# Patient Record
Sex: MALE | Race: WHITE | NOT HISPANIC OR LATINO | Employment: OTHER | URBAN - METROPOLITAN AREA
[De-identification: names, ages, dates, MRNs, and addresses within clinical notes are randomized per-mention and may not be internally consistent; named-entity substitution may affect disease eponyms.]

---

## 2020-06-08 ENCOUNTER — APPOINTMENT (OUTPATIENT)
Dept: CARDIOLOGY | Facility: HOSPITAL | Age: 55
End: 2020-06-08

## 2020-06-08 ENCOUNTER — HOSPITAL ENCOUNTER (INPATIENT)
Facility: HOSPITAL | Age: 55
LOS: 2 days | Discharge: HOME | End: 2020-06-10
Attending: EMERGENCY MEDICINE | Admitting: INTERNAL MEDICINE

## 2020-06-08 ENCOUNTER — APPOINTMENT (OUTPATIENT)
Dept: CT IMAGING | Facility: HOSPITAL | Age: 55
End: 2020-06-08

## 2020-06-08 DIAGNOSIS — R07.9 CHEST PAIN, UNSPECIFIED TYPE: ICD-10-CM

## 2020-06-08 DIAGNOSIS — R41.841 COGNITIVE COMMUNICATION DEFICIT: ICD-10-CM

## 2020-06-08 DIAGNOSIS — E87.6 HYPOKALEMIA: ICD-10-CM

## 2020-06-08 DIAGNOSIS — I63.9 ACUTE ISCHEMIC STROKE (HCC): ICD-10-CM

## 2020-06-08 DIAGNOSIS — R47.1 DYSARTHRIA: ICD-10-CM

## 2020-06-08 DIAGNOSIS — R53.1 LEFT-SIDED WEAKNESS: Primary | ICD-10-CM

## 2020-06-08 PROBLEM — J44.9 COPD (CHRONIC OBSTRUCTIVE PULMONARY DISEASE) (HCC): Status: ACTIVE | Noted: 2020-06-08

## 2020-06-08 PROBLEM — F15.10 CAFFEINE ABUSE (HCC): Status: ACTIVE | Noted: 2020-06-08

## 2020-06-08 PROBLEM — G89.29 CHRONIC PAIN: Status: ACTIVE | Noted: 2020-06-08

## 2020-06-08 PROBLEM — R91.1 PULMONARY NODULE: Status: ACTIVE | Noted: 2020-06-08

## 2020-06-08 PROBLEM — J41.1 BRONCHITIS, MUCOPURULENT RECURRENT (HCC): Status: ACTIVE | Noted: 2020-06-08

## 2020-06-08 PROBLEM — Z87.891 FORMER SMOKER: Status: ACTIVE | Noted: 2020-06-08

## 2020-06-08 PROBLEM — Z78.9 NON-SMOKER: Status: ACTIVE | Noted: 2020-06-08

## 2020-06-08 PROBLEM — B44.9 ASPERGILLOSIS, WITH PNEUMONIA (HCC): Status: ACTIVE | Noted: 2020-06-08

## 2020-06-08 PROBLEM — E66.3 OVERWEIGHT (BMI 25.0-29.9): Status: ACTIVE | Noted: 2020-06-08

## 2020-06-08 PROBLEM — F41.9 ANXIETY DISORDER: Status: ACTIVE | Noted: 2020-06-08

## 2020-06-08 PROBLEM — J45.909 IDIOPATHIC ASTHMA: Status: ACTIVE | Noted: 2020-06-08

## 2020-06-08 PROBLEM — I10 HYPERTENSION: Status: ACTIVE | Noted: 2020-06-08

## 2020-06-08 PROBLEM — R07.89 LEFT-SIDED CHEST WALL PAIN: Status: ACTIVE | Noted: 2020-06-08

## 2020-06-08 LAB
ALT SERPL W P-5'-P-CCNC: 5 U/L (ref 1–41)
AMPHET+METHAMPHET UR QL: NEGATIVE
AMPHETAMINES UR QL: NEGATIVE
APAP SERPL-MCNC: <5 MCG/ML (ref 10–30)
APTT PPP: 36.1 SECONDS (ref 24–37)
ASCENDING AORTA: 4 CM
AST SERPL-CCNC: 17 U/L (ref 1–40)
BARBITURATES UR QL SCN: NEGATIVE
BASE EXCESS BLDA CALC-SCNC: 5 MMOL/L (ref -5–5)
BASOPHILS # BLD AUTO: 0.01 10*3/MM3 (ref 0–0.2)
BASOPHILS NFR BLD AUTO: 0.3 % (ref 0–1.5)
BENZODIAZ UR QL SCN: POSITIVE
BH CV ECHO MEAS - AO MAX PG (FULL): 0.71 MMHG
BH CV ECHO MEAS - AO MAX PG: 8 MMHG
BH CV ECHO MEAS - AO MEAN PG (FULL): 1 MMHG
BH CV ECHO MEAS - AO MEAN PG: 5 MMHG
BH CV ECHO MEAS - AO ROOT AREA (BSA CORRECTED): 1.5
BH CV ECHO MEAS - AO ROOT AREA: 8 CM^2
BH CV ECHO MEAS - AO ROOT DIAM: 3.2 CM
BH CV ECHO MEAS - AO V2 MAX: 145 CM/SEC
BH CV ECHO MEAS - AO V2 MEAN: 102 CM/SEC
BH CV ECHO MEAS - AO V2 VTI: 32.3 CM
BH CV ECHO MEAS - ASC AORTA: 4 CM
BH CV ECHO MEAS - AVA(I,A): 3 CM^2
BH CV ECHO MEAS - AVA(I,D): 3 CM^2
BH CV ECHO MEAS - AVA(V,A): 2.9 CM^2
BH CV ECHO MEAS - AVA(V,D): 2.9 CM^2
BH CV ECHO MEAS - BSA(HAYCOCK): 2.1 M^2
BH CV ECHO MEAS - BSA: 2.1 M^2
BH CV ECHO MEAS - BZI_BMI: 28.3 KILOGRAMS/M^2
BH CV ECHO MEAS - BZI_METRIC_HEIGHT: 177.8 CM
BH CV ECHO MEAS - BZI_METRIC_WEIGHT: 89.4 KG
BH CV ECHO MEAS - EDV(CUBED): 132.7 ML
BH CV ECHO MEAS - EDV(MOD-SP2): 58.6 ML
BH CV ECHO MEAS - EDV(MOD-SP4): 61.4 ML
BH CV ECHO MEAS - EDV(TEICH): 123.8 ML
BH CV ECHO MEAS - EF(CUBED): 67.7 %
BH CV ECHO MEAS - EF(MOD-BP): 64.7 %
BH CV ECHO MEAS - EF(MOD-SP2): 63.5 %
BH CV ECHO MEAS - EF(MOD-SP4): 64.8 %
BH CV ECHO MEAS - EF(TEICH): 58.9 %
BH CV ECHO MEAS - EF{MOD-BP}: 64.7 %
BH CV ECHO MEAS - ESV(CUBED): 42.9 ML
BH CV ECHO MEAS - ESV(MOD-SP2): 21.4 ML
BH CV ECHO MEAS - ESV(MOD-SP4): 21.6 ML
BH CV ECHO MEAS - ESV(TEICH): 50.9 ML
BH CV ECHO MEAS - FS: 31.4 %
BH CV ECHO MEAS - IVS/LVPW: 1
BH CV ECHO MEAS - IVSD: 0.8 CM
BH CV ECHO MEAS - LA DIMENSION: 3.3 CM
BH CV ECHO MEAS - LA/AO: 1
BH CV ECHO MEAS - LAD MAJOR: 5.5 CM
BH CV ECHO MEAS - LAT PEAK E' VEL: 16.8 CM/SEC
BH CV ECHO MEAS - LATERAL E/E' RATIO: 4.5
BH CV ECHO MEAS - LV DIASTOLIC VOL/BSA (35-75): 29.6 ML/M^2
BH CV ECHO MEAS - LV MASS(C)D: 140.5 GRAMS
BH CV ECHO MEAS - LV MASS(C)DI: 67.7 GRAMS/M^2
BH CV ECHO MEAS - LV MAX PG: 7.3 MMHG
BH CV ECHO MEAS - LV MEAN PG: 4 MMHG
BH CV ECHO MEAS - LV SYSTOLIC VOL/BSA (12-30): 10.4 ML/M^2
BH CV ECHO MEAS - LV V1 MAX: 135 CM/SEC
BH CV ECHO MEAS - LV V1 MEAN: 92.3 CM/SEC
BH CV ECHO MEAS - LV V1 VTI: 30.6 CM
BH CV ECHO MEAS - LVIDD: 5.1 CM
BH CV ECHO MEAS - LVIDS: 3.5 CM
BH CV ECHO MEAS - LVLD AP2: 7.6 CM
BH CV ECHO MEAS - LVLD AP4: 8 CM
BH CV ECHO MEAS - LVLS AP2: 6.2 CM
BH CV ECHO MEAS - LVLS AP4: 6.1 CM
BH CV ECHO MEAS - LVOT AREA (M): 3.1 CM^2
BH CV ECHO MEAS - LVOT AREA: 3.1 CM^2
BH CV ECHO MEAS - LVOT DIAM: 2 CM
BH CV ECHO MEAS - LVPWD: 0.8 CM
BH CV ECHO MEAS - MED PEAK E' VEL: 8.9 CM/SEC
BH CV ECHO MEAS - MEDIAL E/E' RATIO: 8.5
BH CV ECHO MEAS - MV A MAX VEL: 72.8 CM/SEC
BH CV ECHO MEAS - MV DEC TIME: 0.29 SEC
BH CV ECHO MEAS - MV E MAX VEL: 75.9 CM/SEC
BH CV ECHO MEAS - MV E/A: 1
BH CV ECHO MEAS - MV MAX PG: 3.4 MMHG
BH CV ECHO MEAS - MV MEAN PG: 2 MMHG
BH CV ECHO MEAS - MV V2 MAX: 92.5 CM/SEC
BH CV ECHO MEAS - MV V2 MEAN: 70.2 CM/SEC
BH CV ECHO MEAS - MV V2 VTI: 27.9 CM
BH CV ECHO MEAS - MVA(VTI): 3.4 CM^2
BH CV ECHO MEAS - PA ACC TIME: 0.16 SEC
BH CV ECHO MEAS - PA MAX PG: 3.3 MMHG
BH CV ECHO MEAS - PA PR(ACCEL): 6.1 MMHG
BH CV ECHO MEAS - PA V2 MAX: 90.3 CM/SEC
BH CV ECHO MEAS - RAP SYSTOLE: 8 MMHG
BH CV ECHO MEAS - SI(AO): 125.3 ML/M^2
BH CV ECHO MEAS - SI(CUBED): 43.3 ML/M^2
BH CV ECHO MEAS - SI(LVOT): 46.4 ML/M^2
BH CV ECHO MEAS - SI(MOD-SP2): 17.9 ML/M^2
BH CV ECHO MEAS - SI(MOD-SP4): 19.2 ML/M^2
BH CV ECHO MEAS - SI(TEICH): 35.2 ML/M^2
BH CV ECHO MEAS - SV(AO): 259.8 ML
BH CV ECHO MEAS - SV(CUBED): 89.8 ML
BH CV ECHO MEAS - SV(LVOT): 96.1 ML
BH CV ECHO MEAS - SV(MOD-SP2): 37.2 ML
BH CV ECHO MEAS - SV(MOD-SP4): 39.8 ML
BH CV ECHO MEAS - SV(TEICH): 72.9 ML
BH CV ECHO MEAS - TAPSE (>1.6): 1.97 CM2
BH CV ECHO MEASUREMENTS AVERAGE E/E' RATIO: 5.91
BH CV VAS BP LEFT ARM: NORMAL MMHG
BH CV XLRA - RV BASE: 4 CM
BH CV XLRA - RV LENGTH: 6.8 CM
BH CV XLRA - RV MID: 3.2 CM
BH CV XLRA - TDI S': 15.8 CM/SEC
BUPRENORPHINE SERPL-MCNC: NEGATIVE NG/ML
CA-I BLDA-SCNC: 1.31 MMOL/L (ref 1.2–1.32)
CANNABINOIDS SERPL QL: NEGATIVE
CHOLEST SERPL-MCNC: 202 MG/DL (ref 0–200)
CO2 BLDA-SCNC: 31 MMOL/L (ref 24–29)
COCAINE UR QL: NEGATIVE
CREAT BLDA-MCNC: 1.1 MG/DL (ref 0.6–1.3)
DEPRECATED RDW RBC AUTO: 46.4 FL (ref 37–54)
EOSINOPHIL # BLD AUTO: 0 10*3/MM3 (ref 0–0.4)
EOSINOPHIL NFR BLD AUTO: 0 % (ref 0.3–6.2)
ERYTHROCYTE [DISTWIDTH] IN BLOOD BY AUTOMATED COUNT: 14.4 % (ref 12.3–15.4)
ETHANOL BLD-MCNC: <10 MG/DL (ref 0–10)
GLUCOSE BLDC GLUCOMTR-MCNC: 116 MG/DL (ref 70–130)
GLUCOSE BLDC GLUCOMTR-MCNC: 93 MG/DL (ref 70–130)
HBA1C MFR BLD: 5.1 % (ref 4.8–5.6)
HCO3 BLDA-SCNC: 29.8 MMOL/L (ref 22–26)
HCT VFR BLD AUTO: 41.6 % (ref 37.5–51)
HCT VFR BLDA CALC: 41 % (ref 38–51)
HDLC SERPL-MCNC: 32 MG/DL (ref 40–60)
HGB BLD-MCNC: 13.6 G/DL (ref 13–17.7)
HGB BLDA-MCNC: 13.9 G/DL (ref 12–17)
HOLD SPECIMEN: NORMAL
HOLD SPECIMEN: NORMAL
IMM GRANULOCYTES # BLD AUTO: 0.01 10*3/MM3 (ref 0–0.05)
IMM GRANULOCYTES NFR BLD AUTO: 0.3 % (ref 0–0.5)
INR PPP: 1 (ref 0.8–1.2)
LDLC SERPL CALC-MCNC: 130 MG/DL (ref 0–100)
LDLC/HDLC SERPL: 4.07 {RATIO}
LEFT ATRIUM VOLUME INDEX: 16.4 ML/M^2
LEFT ATRIUM VOLUME: 34 ML
LYMPHOCYTES # BLD AUTO: 1.32 10*3/MM3 (ref 0.7–3.1)
LYMPHOCYTES NFR BLD AUTO: 35.4 % (ref 19.6–45.3)
MAGNESIUM SERPL-MCNC: 2.1 MG/DL (ref 1.6–2.6)
MCH RBC QN AUTO: 28.8 PG (ref 26.6–33)
MCHC RBC AUTO-ENTMCNC: 32.7 G/DL (ref 31.5–35.7)
MCV RBC AUTO: 88.1 FL (ref 79–97)
METHADONE UR QL SCN: NEGATIVE
MONOCYTES # BLD AUTO: 0.32 10*3/MM3 (ref 0.1–0.9)
MONOCYTES NFR BLD AUTO: 8.6 % (ref 5–12)
MV VENA CONTRACTA: 0.33 CM
NEUTROPHILS # BLD AUTO: 2.07 10*3/MM3 (ref 1.7–7)
NEUTROPHILS NFR BLD AUTO: 55.4 % (ref 42.7–76)
NRBC BLD AUTO-RTO: 0 /100 WBC (ref 0–0.2)
OPIATES UR QL: POSITIVE
OXYCODONE UR QL SCN: NEGATIVE
PCO2 BLDA: 50.2 MM HG (ref 35–45)
PCP UR QL SCN: NEGATIVE
PH BLDA: 7.38 PH UNITS (ref 7.35–7.6)
PLATELET # BLD AUTO: 186 10*3/MM3 (ref 140–450)
PMV BLD AUTO: 10.3 FL (ref 6–12)
PO2 BLDA: 28 MMHG (ref 80–105)
POTASSIUM BLD-SCNC: 4.2 MMOL/L (ref 3.5–5.2)
POTASSIUM BLDA-SCNC: 3.3 MMOL/L (ref 3.5–4.9)
PROPOXYPH UR QL: NEGATIVE
PROTHROMBIN TIME: 12.3 SECONDS (ref 12.8–15.2)
RBC # BLD AUTO: 4.72 10*6/MM3 (ref 4.14–5.8)
SALICYLATES SERPL-MCNC: 1.1 MG/DL
SAO2 % BLDA: 51 % (ref 95–98)
SODIUM BLDA-SCNC: 142 MMOL/L (ref 138–146)
T4 FREE SERPL-MCNC: 1.18 NG/DL (ref 0.93–1.7)
TRICYCLICS UR QL SCN: NEGATIVE
TRIGL SERPL-MCNC: 199 MG/DL (ref 0–150)
TROPONIN T SERPL-MCNC: <0.01 NG/ML (ref 0–0.03)
TROPONIN T SERPL-MCNC: <0.01 NG/ML (ref 0–0.03)
TSH SERPL DL<=0.05 MIU/L-ACNC: 1.61 UIU/ML (ref 0.27–4.2)
VLDLC SERPL-MCNC: 39.8 MG/DL
WBC NRBC COR # BLD: 3.73 10*3/MM3 (ref 3.4–10.8)
WHOLE BLOOD HOLD SPECIMEN: NORMAL
WHOLE BLOOD HOLD SPECIMEN: NORMAL

## 2020-06-08 PROCEDURE — 84439 ASSAY OF FREE THYROXINE: CPT | Performed by: EMERGENCY MEDICINE

## 2020-06-08 PROCEDURE — 93306 TTE W/DOPPLER COMPLETE: CPT

## 2020-06-08 PROCEDURE — 85730 THROMBOPLASTIN TIME PARTIAL: CPT | Performed by: EMERGENCY MEDICINE

## 2020-06-08 PROCEDURE — 0 IOPAMIDOL PER 1 ML: Performed by: EMERGENCY MEDICINE

## 2020-06-08 PROCEDURE — 80061 LIPID PANEL: CPT | Performed by: NURSE PRACTITIONER

## 2020-06-08 PROCEDURE — 99291 CRITICAL CARE FIRST HOUR: CPT | Performed by: INTERNAL MEDICINE

## 2020-06-08 PROCEDURE — 84484 ASSAY OF TROPONIN QUANT: CPT | Performed by: NURSE PRACTITIONER

## 2020-06-08 PROCEDURE — 94799 UNLISTED PULMONARY SVC/PX: CPT

## 2020-06-08 PROCEDURE — 25010000002 HYDROMORPHONE PER 4 MG: Performed by: INTERNAL MEDICINE

## 2020-06-08 PROCEDURE — 97162 PT EVAL MOD COMPLEX 30 MIN: CPT

## 2020-06-08 PROCEDURE — 99222 1ST HOSP IP/OBS MODERATE 55: CPT | Performed by: NURSE PRACTITIONER

## 2020-06-08 PROCEDURE — 70450 CT HEAD/BRAIN W/O DYE: CPT

## 2020-06-08 PROCEDURE — 83036 HEMOGLOBIN GLYCOSYLATED A1C: CPT | Performed by: NURSE PRACTITIONER

## 2020-06-08 PROCEDURE — 25010000002 MORPHINE PER 10 MG: Performed by: EMERGENCY MEDICINE

## 2020-06-08 PROCEDURE — 85025 COMPLETE CBC W/AUTO DIFF WBC: CPT | Performed by: EMERGENCY MEDICINE

## 2020-06-08 PROCEDURE — 97165 OT EVAL LOW COMPLEX 30 MIN: CPT

## 2020-06-08 PROCEDURE — 0 IOPAMIDOL PER 1 ML

## 2020-06-08 PROCEDURE — 93306 TTE W/DOPPLER COMPLETE: CPT | Performed by: INTERNAL MEDICINE

## 2020-06-08 PROCEDURE — 82947 ASSAY GLUCOSE BLOOD QUANT: CPT

## 2020-06-08 PROCEDURE — 85610 PROTHROMBIN TIME: CPT

## 2020-06-08 PROCEDURE — 84484 ASSAY OF TROPONIN QUANT: CPT | Performed by: EMERGENCY MEDICINE

## 2020-06-08 PROCEDURE — 99285 EMERGENCY DEPT VISIT HI MDM: CPT

## 2020-06-08 PROCEDURE — 82962 GLUCOSE BLOOD TEST: CPT

## 2020-06-08 PROCEDURE — 70496 CT ANGIOGRAPHY HEAD: CPT

## 2020-06-08 PROCEDURE — 70498 CT ANGIOGRAPHY NECK: CPT

## 2020-06-08 PROCEDURE — 84295 ASSAY OF SERUM SODIUM: CPT

## 2020-06-08 PROCEDURE — 94640 AIRWAY INHALATION TREATMENT: CPT

## 2020-06-08 PROCEDURE — 80307 DRUG TEST PRSMV CHEM ANLYZR: CPT | Performed by: EMERGENCY MEDICINE

## 2020-06-08 PROCEDURE — 71275 CT ANGIOGRAPHY CHEST: CPT

## 2020-06-08 PROCEDURE — 85014 HEMATOCRIT: CPT

## 2020-06-08 PROCEDURE — 25010000002 ALTEPLASE PER 1 MG: Performed by: EMERGENCY MEDICINE

## 2020-06-08 PROCEDURE — 3E03317 INTRODUCTION OF OTHER THROMBOLYTIC INTO PERIPHERAL VEIN, PERCUTANEOUS APPROACH: ICD-10-PCS | Performed by: EMERGENCY MEDICINE

## 2020-06-08 PROCEDURE — 92610 EVALUATE SWALLOWING FUNCTION: CPT

## 2020-06-08 PROCEDURE — 25010000003 POTASSIUM CHLORIDE 10 MEQ/100ML SOLUTION: Performed by: NURSE PRACTITIONER

## 2020-06-08 PROCEDURE — 93010 ELECTROCARDIOGRAM REPORT: CPT | Performed by: INTERNAL MEDICINE

## 2020-06-08 PROCEDURE — 82803 BLOOD GASES ANY COMBINATION: CPT

## 2020-06-08 PROCEDURE — 83735 ASSAY OF MAGNESIUM: CPT | Performed by: EMERGENCY MEDICINE

## 2020-06-08 PROCEDURE — 82330 ASSAY OF CALCIUM: CPT

## 2020-06-08 PROCEDURE — 0042T HC CT CEREBRAL PERFUSION W/WO CONTRAST: CPT

## 2020-06-08 PROCEDURE — 84132 ASSAY OF SERUM POTASSIUM: CPT | Performed by: INTERNAL MEDICINE

## 2020-06-08 PROCEDURE — 93005 ELECTROCARDIOGRAM TRACING: CPT | Performed by: EMERGENCY MEDICINE

## 2020-06-08 PROCEDURE — 84450 TRANSFERASE (AST) (SGOT): CPT | Performed by: EMERGENCY MEDICINE

## 2020-06-08 PROCEDURE — 80306 DRUG TEST PRSMV INSTRMNT: CPT | Performed by: EMERGENCY MEDICINE

## 2020-06-08 PROCEDURE — 84460 ALANINE AMINO (ALT) (SGPT): CPT | Performed by: EMERGENCY MEDICINE

## 2020-06-08 PROCEDURE — 82565 ASSAY OF CREATININE: CPT

## 2020-06-08 PROCEDURE — 84132 ASSAY OF SERUM POTASSIUM: CPT

## 2020-06-08 PROCEDURE — 93005 ELECTROCARDIOGRAM TRACING: CPT | Performed by: NURSE PRACTITIONER

## 2020-06-08 PROCEDURE — 84443 ASSAY THYROID STIM HORMONE: CPT | Performed by: EMERGENCY MEDICINE

## 2020-06-08 PROCEDURE — 92523 SPEECH SOUND LANG COMPREHEN: CPT

## 2020-06-08 RX ORDER — SIMVASTATIN 20 MG
20 TABLET ORAL NIGHTLY
COMMUNITY
End: 2020-06-10 | Stop reason: HOSPADM

## 2020-06-08 RX ORDER — AZITHROMYCIN 250 MG/1
250 TABLET, FILM COATED ORAL DAILY
COMMUNITY

## 2020-06-08 RX ORDER — HYDROCHLOROTHIAZIDE 25 MG/1
25 TABLET ORAL DAILY
Status: DISCONTINUED | OUTPATIENT
Start: 2020-06-08 | End: 2020-06-10 | Stop reason: HOSPADM

## 2020-06-08 RX ORDER — ALBUTEROL SULFATE 90 UG/1
2 AEROSOL, METERED RESPIRATORY (INHALATION) EVERY 4 HOURS PRN
COMMUNITY

## 2020-06-08 RX ORDER — METOPROLOL TARTRATE 50 MG/1
50 TABLET, FILM COATED ORAL EVERY 12 HOURS SCHEDULED
Status: DISCONTINUED | OUTPATIENT
Start: 2020-06-08 | End: 2020-06-10 | Stop reason: HOSPADM

## 2020-06-08 RX ORDER — ASPIRIN 325 MG
325 TABLET ORAL DAILY
Status: DISCONTINUED | OUTPATIENT
Start: 2020-06-09 | End: 2020-06-10 | Stop reason: HOSPADM

## 2020-06-08 RX ORDER — ASPIRIN 300 MG/1
300 SUPPOSITORY RECTAL DAILY
Status: DISCONTINUED | OUTPATIENT
Start: 2020-06-09 | End: 2020-06-10 | Stop reason: HOSPADM

## 2020-06-08 RX ORDER — POTASSIUM CHLORIDE 7.45 MG/ML
10 INJECTION INTRAVENOUS
Status: DISCONTINUED | OUTPATIENT
Start: 2020-06-08 | End: 2020-06-09

## 2020-06-08 RX ORDER — MORPHINE SULFATE 2 MG/ML
2 INJECTION, SOLUTION INTRAMUSCULAR; INTRAVENOUS ONCE
Status: COMPLETED | OUTPATIENT
Start: 2020-06-08 | End: 2020-06-08

## 2020-06-08 RX ORDER — ALBUTEROL SULFATE 2.5 MG/3ML
2.5 SOLUTION RESPIRATORY (INHALATION) EVERY 6 HOURS PRN
Status: DISCONTINUED | OUTPATIENT
Start: 2020-06-08 | End: 2020-06-10 | Stop reason: HOSPADM

## 2020-06-08 RX ORDER — SODIUM CHLORIDE 0.9 % (FLUSH) 0.9 %
10 SYRINGE (ML) INJECTION AS NEEDED
Status: DISCONTINUED | OUTPATIENT
Start: 2020-06-08 | End: 2020-06-10 | Stop reason: HOSPADM

## 2020-06-08 RX ORDER — ALPRAZOLAM 0.25 MG/1
0.25 TABLET ORAL 3 TIMES DAILY PRN
Status: DISCONTINUED | OUTPATIENT
Start: 2020-06-08 | End: 2020-06-10 | Stop reason: HOSPADM

## 2020-06-08 RX ORDER — BUDESONIDE AND FORMOTEROL FUMARATE DIHYDRATE 160; 4.5 UG/1; UG/1
2 AEROSOL RESPIRATORY (INHALATION)
Status: DISCONTINUED | OUTPATIENT
Start: 2020-06-08 | End: 2020-06-10 | Stop reason: HOSPADM

## 2020-06-08 RX ORDER — HYDROCODONE BITARTRATE AND ACETAMINOPHEN 10; 325 MG/1; MG/1
1 TABLET ORAL 4 TIMES DAILY
COMMUNITY

## 2020-06-08 RX ORDER — TAMSULOSIN HYDROCHLORIDE 0.4 MG/1
1 CAPSULE ORAL DAILY
COMMUNITY

## 2020-06-08 RX ORDER — POTASSIUM CHLORIDE 750 MG/1
40 CAPSULE, EXTENDED RELEASE ORAL AS NEEDED
Status: DISCONTINUED | OUTPATIENT
Start: 2020-06-08 | End: 2020-06-10 | Stop reason: HOSPADM

## 2020-06-08 RX ORDER — GABAPENTIN 400 MG/1
400 CAPSULE ORAL 3 TIMES DAILY
COMMUNITY

## 2020-06-08 RX ORDER — DIAZEPAM 10 MG/1
10 TABLET ORAL 3 TIMES DAILY
COMMUNITY

## 2020-06-08 RX ORDER — HYDROCODONE BITARTRATE AND ACETAMINOPHEN 7.5; 325 MG/1; MG/1
1 TABLET ORAL EVERY 6 HOURS PRN
Status: DISCONTINUED | OUTPATIENT
Start: 2020-06-08 | End: 2020-06-10 | Stop reason: HOSPADM

## 2020-06-08 RX ORDER — SODIUM CHLORIDE 0.9 % (FLUSH) 0.9 %
10 SYRINGE (ML) INJECTION AS NEEDED
Status: DISCONTINUED | OUTPATIENT
Start: 2020-06-08 | End: 2020-06-08

## 2020-06-08 RX ORDER — SODIUM CHLORIDE 0.9 % (FLUSH) 0.9 %
10 SYRINGE (ML) INJECTION EVERY 12 HOURS SCHEDULED
Status: DISCONTINUED | OUTPATIENT
Start: 2020-06-08 | End: 2020-06-10 | Stop reason: HOSPADM

## 2020-06-08 RX ORDER — MONTELUKAST SODIUM 10 MG/1
10 TABLET ORAL NIGHTLY
Status: DISCONTINUED | OUTPATIENT
Start: 2020-06-08 | End: 2020-06-10 | Stop reason: HOSPADM

## 2020-06-08 RX ORDER — MONTELUKAST SODIUM 10 MG/1
10 TABLET ORAL NIGHTLY
COMMUNITY

## 2020-06-08 RX ORDER — IPRATROPIUM BROMIDE AND ALBUTEROL SULFATE 2.5; .5 MG/3ML; MG/3ML
3 SOLUTION RESPIRATORY (INHALATION)
Status: DISCONTINUED | OUTPATIENT
Start: 2020-06-08 | End: 2020-06-10 | Stop reason: HOSPADM

## 2020-06-08 RX ORDER — TAMSULOSIN HYDROCHLORIDE 0.4 MG/1
0.4 CAPSULE ORAL DAILY
Status: DISCONTINUED | OUTPATIENT
Start: 2020-06-08 | End: 2020-06-10 | Stop reason: HOSPADM

## 2020-06-08 RX ORDER — GABAPENTIN 400 MG/1
400 CAPSULE ORAL 3 TIMES DAILY
Status: DISCONTINUED | OUTPATIENT
Start: 2020-06-08 | End: 2020-06-10 | Stop reason: HOSPADM

## 2020-06-08 RX ORDER — ATORVASTATIN CALCIUM 40 MG/1
80 TABLET, FILM COATED ORAL NIGHTLY
Status: DISCONTINUED | OUTPATIENT
Start: 2020-06-08 | End: 2020-06-10 | Stop reason: HOSPADM

## 2020-06-08 RX ORDER — METOPROLOL TARTRATE 50 MG/1
50 TABLET, FILM COATED ORAL 2 TIMES DAILY
COMMUNITY

## 2020-06-08 RX ORDER — POTASSIUM CHLORIDE 1.5 G/1.77G
40 POWDER, FOR SOLUTION ORAL AS NEEDED
Status: DISCONTINUED | OUTPATIENT
Start: 2020-06-08 | End: 2020-06-09

## 2020-06-08 RX ORDER — SODIUM CHLORIDE 9 MG/ML
100 INJECTION, SOLUTION INTRAVENOUS ONCE
Status: COMPLETED | OUTPATIENT
Start: 2020-06-08 | End: 2020-06-08

## 2020-06-08 RX ORDER — AMLODIPINE BESYLATE 5 MG/1
5 TABLET ORAL DAILY
COMMUNITY

## 2020-06-08 RX ORDER — HYDROCHLOROTHIAZIDE 25 MG/1
25 TABLET ORAL DAILY
COMMUNITY

## 2020-06-08 RX ORDER — PREDNISONE 10 MG/1
10 TABLET ORAL DAILY
COMMUNITY

## 2020-06-08 RX ORDER — AMLODIPINE BESYLATE 5 MG/1
5 TABLET ORAL DAILY
Status: DISCONTINUED | OUTPATIENT
Start: 2020-06-08 | End: 2020-06-10 | Stop reason: HOSPADM

## 2020-06-08 RX ORDER — METFORMIN HYDROCHLORIDE 500 MG/1
1000 TABLET, EXTENDED RELEASE ORAL
COMMUNITY

## 2020-06-08 RX ORDER — HYDROMORPHONE HYDROCHLORIDE 1 MG/ML
0.25 INJECTION, SOLUTION INTRAMUSCULAR; INTRAVENOUS; SUBCUTANEOUS
Status: COMPLETED | OUTPATIENT
Start: 2020-06-08 | End: 2020-06-08

## 2020-06-08 RX ADMIN — MONTELUKAST SODIUM 10 MG: 10 TABLET, COATED ORAL at 21:11

## 2020-06-08 RX ADMIN — IOPAMIDOL 115 ML: 755 INJECTION, SOLUTION INTRAVENOUS at 04:13

## 2020-06-08 RX ADMIN — SODIUM CHLORIDE 100 ML/HR: 9 INJECTION, SOLUTION INTRAVENOUS at 05:23

## 2020-06-08 RX ADMIN — POTASSIUM CHLORIDE 10 MEQ: 7.46 INJECTION, SOLUTION INTRAVENOUS at 12:58

## 2020-06-08 RX ADMIN — HYDROMORPHONE HYDROCHLORIDE 0.25 MG: 1 INJECTION, SOLUTION INTRAMUSCULAR; INTRAVENOUS; SUBCUTANEOUS at 09:53

## 2020-06-08 RX ADMIN — ALTEPLASE 8.06 MG: KIT at 04:25

## 2020-06-08 RX ADMIN — ALPRAZOLAM 0.25 MG: 0.25 TABLET ORAL at 21:11

## 2020-06-08 RX ADMIN — ATORVASTATIN CALCIUM 80 MG: 40 TABLET, FILM COATED ORAL at 21:11

## 2020-06-08 RX ADMIN — GABAPENTIN 400 MG: 400 CAPSULE ORAL at 16:54

## 2020-06-08 RX ADMIN — BUDESONIDE AND FORMOTEROL FUMARATE DIHYDRATE 2 PUFF: 160; 4.5 AEROSOL RESPIRATORY (INHALATION) at 20:09

## 2020-06-08 RX ADMIN — IPRATROPIUM BROMIDE AND ALBUTEROL SULFATE 3 ML: 2.5; .5 SOLUTION RESPIRATORY (INHALATION) at 16:28

## 2020-06-08 RX ADMIN — SERTRALINE HYDROCHLORIDE 50 MG: 50 TABLET ORAL at 15:04

## 2020-06-08 RX ADMIN — BUDESONIDE AND FORMOTEROL FUMARATE DIHYDRATE 2 PUFF: 160; 4.5 AEROSOL RESPIRATORY (INHALATION) at 08:22

## 2020-06-08 RX ADMIN — HYDROCODONE BITARTRATE AND ACETAMINOPHEN 1 TABLET: 7.5; 325 TABLET ORAL at 15:04

## 2020-06-08 RX ADMIN — HYDROCODONE BITARTRATE AND ACETAMINOPHEN 1 TABLET: 7.5; 325 TABLET ORAL at 21:11

## 2020-06-08 RX ADMIN — TAMSULOSIN HYDROCHLORIDE 0.4 MG: 0.4 CAPSULE ORAL at 15:04

## 2020-06-08 RX ADMIN — IPRATROPIUM BROMIDE AND ALBUTEROL SULFATE 3 ML: 2.5; .5 SOLUTION RESPIRATORY (INHALATION) at 13:17

## 2020-06-08 RX ADMIN — POTASSIUM CHLORIDE 10 MEQ: 7.46 INJECTION, SOLUTION INTRAVENOUS at 11:01

## 2020-06-08 RX ADMIN — ALTEPLASE 72.5 MG: KIT at 04:26

## 2020-06-08 RX ADMIN — IPRATROPIUM BROMIDE AND ALBUTEROL SULFATE 3 ML: 2.5; .5 SOLUTION RESPIRATORY (INHALATION) at 08:21

## 2020-06-08 RX ADMIN — POTASSIUM CHLORIDE 10 MEQ: 7.46 INJECTION, SOLUTION INTRAVENOUS at 09:10

## 2020-06-08 RX ADMIN — IPRATROPIUM BROMIDE AND ALBUTEROL SULFATE 3 ML: 2.5; .5 SOLUTION RESPIRATORY (INHALATION) at 20:09

## 2020-06-08 RX ADMIN — SODIUM CHLORIDE, PRESERVATIVE FREE 10 ML: 5 INJECTION INTRAVENOUS at 20:07

## 2020-06-08 RX ADMIN — GABAPENTIN 400 MG: 400 CAPSULE ORAL at 21:12

## 2020-06-08 RX ADMIN — MORPHINE SULFATE 2 MG: 2 INJECTION, SOLUTION INTRAMUSCULAR; INTRAVENOUS at 04:44

## 2020-06-08 RX ADMIN — IOPAMIDOL 100 ML: 755 INJECTION, SOLUTION INTRAVENOUS at 04:22

## 2020-06-08 NOTE — PROGRESS NOTES
INTENSIVIST   PROGRESS NOTE     Hospital:  LOS: 0 days      S     Mr. Pernell Adkins, 54 y.o. 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     Interval History:  Pain remains an issue.  Open eyes. Not talking much.  Confused.    Temp  Min: 97.5 °F (36.4 °C)  Max: 98.2 °F (36.8 °C)     The patient's relevant past medical, surgical and social history were reviewed and updated in Epic as appropriate.          O     Vitals:  Temp: 97.7 °F (36.5 °C) (06/08/20 1200) Temp  Min: 97.5 °F (36.4 °C)  Max: 98.2 °F (36.8 °C)   Temp core:      BP: 123/83 (06/08/20 1300) BP  Min: 95/63  Max: 147/102   Pulse: 62 (06/08/20 1317) Pulse  Min: 53  Max: 97   Resp: 18 (06/08/20 1200) Resp  Min: 18  Max: 24   SpO2: 96 % (06/08/20 1317) SpO2  Min: 91 %  Max: 100 %   Device: room air (06/08/20 1317)    Flow Rate:   No data recorded     Intake/Ouptut 24 hrs (7:00AM - 6:59 AM)  Intake & Output (last 3 days)       06/05 0701 - 06/06 0700 06/06 0701 - 06/07 0700 06/07 0701 - 06/08 0700 06/08 0701 - 06/09 0700    P.O.    200    I.V. (mL/kg)   100 (1.1)     IV Piggyback    200    Total Intake(mL/kg)   100 (1.1) 400 (4.5)    Urine (mL/kg/hr)    200 (0.3)    Total Output    200    Net   +100 +200                Medications (drips):    niCARdipine       Physical Examination  Telemetry:  Rhythm: normal sinus rhythm (06/08/20 1200)         Constitutional:  No acute distress.   Cardiovascular: RRR.   Normal heart sounds.  No murmurs, gallop or rub.   Respiratory: Normal breath sounds  No adventitious sounds.   Abdominal:  Soft with no  tenderness.  No distension.   No HSM.   Extremities: Warm.  Dry.  No cyanosis.  No Edema   Neurological:   Awake.  Best Eye Response: 3-->(E3) to speech (06/08/20 1200)  Best Motor Response: 6-->(M6) obeys commands (06/08/20 1200)  Best Verbal Response: 5-->(V5) oriented (06/08/20 1200)  Bloomer Coma Scale Score: 14 (06/08/20 1200)     Hematology:  Results from last 7 days   Lab Units 06/08/20  0411 06/08/20  0408   WBC 10*3/mm3 3.73  --    HEMOGLOBIN g/dL 13.6  --    HEMOGLOBIN, POC g/dL  --  13.9   MCV fL 88.1  --    PLATELETS 10*3/mm3 186  --    NEUTROS ABS 10*3/mm3 2.07  --    LYMPHS ABS 10*3/mm3 1.32  --    EOS ABS 10*3/mm3 0.00  --    IMMATURE GRANS (ABS) 10*3/mm3 0.01  --        Chemistry:  Estimated Creatinine Clearance: 84.4 mL/min (by C-G formula based on SCr of 1.1 mg/dL).    Results from last 7 days   Lab Units 06/08/20  0404   CREATININE mg/dL 1.10     Results from last 7 days   Lab Units 06/08/20  0412   MAGNESIUM mg/dL 2.1     Results from last 7 days   Lab Units 06/08/20  0411   ALT (SGPT) U/L 5   AST (SGOT) U/L 17       Inflammatory markers:  Results from last 7 days   Lab Units 06/08/20  0412   TRIGLYCERIDES mg/dL 199*     Coagulation tests:      Cardiac Labs:  Results from last 7 days   Lab Units 06/08/20  0411   TROPONIN T ng/mL <0.010     Images:  Ct Angiogram Head    Result Date: 6/8/2020  Negative CT angiogram of the head and neck. No intracranial or extracranial arterial flow limiting stenosis or aneurysm. Widely patent cervical carotid bifurcations bilaterally. Signer Name: Charlie Hardy MD  Signed: 6/8/2020 4:33 AM  Workstation Name: [email or web address]  [facility]    Ct Head Without Contrast    Result Date: 6/8/2020  Senescent changes without acute abnormality. Signer Name: Phu Shin MD  Signed: 6/8/2020 6:10 AM  Workstation Name: [email or web address]  [facility] [employer]    Ct Angiogram Neck    Result Date: 6/8/2020  Negative CT angiogram of the head  and neck. No intracranial or extracranial arterial flow limiting stenosis or aneurysm. Widely patent cervical carotid bifurcations bilaterally. Signer Name: Charlie Hardy MD  Signed: 6/8/2020 4:33 AM  Workstation Name: [facility]    Ct Angiogram Chest    Result Date: 6/8/2020  1.  No evidence of pulmonary embolism or other acute vascular abnormality within the chest. 2.  Mild dependent posterior lung base atelectasis. Lungs otherwise clear. 3.  Noncalcified 6 mm peripheral right lower lobe pulmonary nodule. See below. Lung nodule management recommendation: Current published guidelines recommend initial follow-up CT in 6-12 months, and again in 18-24 months for uncomplicated pulmonary nodules measuring 6 to 8 mm in low risk patients (Fleischner Society guidelines, 2017). Signer Name: Charlie Hardy MD  Signed: 6/8/2020 4:59 AM  Workstation Name: [facility]    Ct Head Without Contrast Stroke Protocol    Result Date: 6/8/2020  1.  No acute intracranial abnormality. 2.  Extensive bilateral paranasal sinus costal disease. NOTIFICATION: Critical Value/emergent results were relayed from me to the ED treatment team by telephone through the CT technologist, Stoney, at 04:07 on 6/8/2020. Signer Name: Charlie Hardy MD  Signed: 6/8/2020 4:09 AM  Workstation Name: [facility]    Ct Cerebral Perfusion With & Without Contrast    Result Date: 6/8/2020  Negative CT perfusion study. No evidence of cerebral ischemia or core infarction. Signer Name: Charlie Hardy MD  Signed: 6/8/2020 4:26 AM  Workstation Name: [facility]      Echo:       Results: Reviewed.  I reviewed the patient's new laboratory and imaging results.  I independently reviewed the patient's new images.    Medications: Reviewed.    Assessment/Plan   A / P     Assessment:    54 y.o.male, admitted  on 6/8/2020 with Left-sided weakness [R53.1]:     1. AIS, s/p tPA (Left sided weakness and dysarthria)   1. Negative perfusion scan  2. HTN  3. Non smoker  4. Pulmonary  1. Ashtma no exacerbation  2. RLL 6 mm SPN  5. Chronic pain  6. Caffeine abuse  7. Anxiety disorder  8. Antibiotics:  9. Glucose:    Results from last 7 days   Lab Units 06/08/20  1119 06/08/20  0453   GLUCOSE mg/dL 93 116     Lab Results   Lab Value Date/Time    HGBA1C 5.10 06/08/2020 0411       Nutrition Support: Patient isn't on Tube Feeding   Modulars: Patient doesn't have any tube feeding modular orders   Diet: Diet Soft Texture; Whole Foods; Thin; Cardiac   Advance Directives: Code Status and Medical Interventions:   Ordered at: 06/08/20 0455     Code Status:    CPR     Medical Interventions (Level of Support Prior to Arrest):    Full        Plan:    1. AIS pathway  2. Awaiting MRI / ECHO  3. Awaiting further Stroke Team recommendations.  4. Disposition: Keep in ICU.    Plan of care and goals reviewed during interdisciplinary rounds.  I discussed the patient's findings and my recommendations with patient and nursing staff    Level of Risk is High due to:  illness with threat to life or bodily function.     Time: 25 minutes, in direct patient care, with the patient and/or on the bobo coordinating care with other health care providers.     I have spent > 50% percent of this time, counseling and discussing management.     Angel Singh MD, FACP, FCCP, CNSC  Intensive Care Medicine, Nutrition Support and Pulmonary Medicine     [x]  Primary Attending  []  Consultant

## 2020-06-08 NOTE — CONSULTS
Stroke Consult Note    Patient Name: Kenny Adkins   MRN: [id number]  Age: 54 y.o.  Sex: male  : 1965    Primary Care Physician: No primary care provider on file.  Referring Physician:  No ref. provider found    TIME STROKE TEAM CALLED: 356 EST     TIME PATIENT SEEN: 400 EST    Handedness: Right  Race:     Chief Complaint/Reason for Consultation: Left sided weakness, Left facial droop, and slurred speech    HPI: Kenny Adkins is a 54 yr old with a PMH significant for HTN and COPD (from occupational exposure) that presented to [facility] ED via EMS from [facility] with c/o left sided weakness, left facial droop, and slurred speech. His last known well was between 1 and 130 am. He was with his son who is a pharmacist noted his symptoms and called EMS. En route to [facility], he was noted to have a FSBS of 50 and was given D 10 with no changes in symptoms noted. He states that he vomited while in transport and has had a cough since vomiting. Upon arrival to [facility], he continues to have left sided weakness, facial droop, and slurred speech. In addition, he is c/o left sided chest pain. CTH was negative for hemorrhage. CTP negative. CTA H/N negative. Dr. Roca spoke with the patient and his son regarding TPA including risks/benefits. The patient had no contraindications.The decision was made to administer TPA. CT chest negative for dissection or aneurysm.     Last Known Normal Date/Time:  EST     Review of Systems   Respiratory: Positive for cough and wheezing.    Cardiovascular: Positive for chest pain.   Neurological: Positive for facial asymmetry, speech difficulty, weakness and numbness.   All other systems reviewed and are negative.       Temp:  [98.2 °F (36.8 °C)] 98.2 °F (36.8 °C)  Heart Rate:  [70] 70  Resp:  [24] 24  BP: (119)/(80) 119/80    Neurological Exam  Mental Status   Oriented to person, place, time and situation. dysarthria present. Language is fluent with no aphasia.    Cranial Nerves  CN II:  Visual fields full to confrontation.  CN III, IV, VI: Extraocular movements intact bilaterally. Extraocular movements intact bilaterally. Normal lids and orbits bilaterally. Pupils equal round and reactive to light bilaterally.  CN V:  Left: Diminished sensation of the entire left side of the face.  CN VII:  Left: There is central facial weakness.  CN VIII: Hearing is normal.  CN IX, X: Palate elevates symmetrically. Normal gag reflex.    Motor    RUE/RLE strength 5/5, LUE/LLE strength 3/5.    Sensory  Light touch abnormality:       Physical Exam   Constitutional: He appears well-developed and well-nourished.   HENT:   Head: Normocephalic and atraumatic.   Eyes: Pupils are equal, round, and reactive to light. EOM and lids are normal.   Neck: Normal range of motion. Neck supple.   Cardiovascular: Normal rate and regular rhythm.   Pulmonary/Chest: Effort normal. He has wheezes.   Abdominal: Soft. Bowel sounds are normal.   Musculoskeletal:   As above   Neurological:   As above   Skin: Skin is warm and dry.   Psychiatric: He has a normal mood and affect.       Acute Stroke Data    Alteplase (tPA) Inclusion / Exclusion Criteria    Time: 0430  Person Administering Scale: TOBIAS Wu    Inclusion Criteria  [x]   18 years of age or greater   [x]   Onset of symptoms < 4.5 hours before beginning treatment (stroke onset = time patient was last seen well or without symptoms).   [x]   Diagnosis of acute ischemic stroke causing measurable disabling deficit (Complete Hemianopia, Any Aphasia, Visual or Sensory Extinction, Any weakness limiting sustained effort against gravity)   [x]   Any remaining deficit considered potentially disabling in view of patient and practitioner   Exclusion criteria (Do not proceed with Alteplase if any are checked under exclusion criteria)  []   Onset unknown or GREATER than 4.5 hours   []   ICH on CT/MRI   []   CT demonstrates hypodensity representing acute or subacute infarct   []    Significant head trauma or prior stroke in the previous 3 months   []   Symptoms suggestive of subarachnoid hemorrhage   []   History of un-ruptured intracranial aneurysm GREATER than 10 mm   []   Recent intracranial or intraspinal surgery within the last 3 months   []   Arterial puncture at a non-compressible site in the previous 7 days   []   Active internal bleeding   []   Acute bleeding tendency   []   Platelet count LESS than 100,000 for known hematological diseases such as leukemia, thrombocytopenia or chronic cirrhosis   []   Current use of anticoagulant with INR GREATER than 1.7 or PT GREATER than 15 seconds, aPTT GREATER than 40 seconds   []   Heparin received within 48 hours, resulting in abnormally elevated aPTT GREATER than upper limit of normal   []   Current use of direct thrombin inhibitors or direct factor Xa inhibitors in the past 48 hours   []   Elevated blood pressure refractory to treatment (systolic GREATER than 185 mm/Hg or diastolic  GREATER than 110 mm/Hg   []   Suspected infective endocarditis and aortic arch dissection   []   Current use of therapeutic treatment dose of low-molecular-weight heparin (LMWH) within the previous 24 hours   []   Structural GI malignancy or bleed   Relative exclusion for all patients  []   Only minor non-disabling symptoms   []   Pregnancy   []   Seizure at onset with postictal residual neurological impairments   []   Major surgery or previous trauma within past 14 days   []   History of previous spontaneous ICH, intracranial neoplasm, or AV malformation   []   Postpartum (within previous 14 days)   []   Recent GI or urinary tract hemorrhage (within previous 21 days)   []   Recent acute MI (within previous 3 months)   []   History of un-ruptured intracranial aneurysm LESS than 10 mm   []   History of ruptured intracranial aneurysm   []   Blood glucose LESS than 50 mg/dL (2.7 mmol/L)   []   Dural puncture within the last 7 days   []   Known GREATER than 10  cerebral microbleeds   Additional exclusions for patients with symptoms onset between 3 and 4.5 hours.  []   Age > 80.   []   On any anticoagulants regardless of INR  >>> Warfarin (Coumadin), Heparin, Enoxaparin (Lovenox), fondaparinux (Arixtra), bivalirudin (Angiomax), Argatroban, dabigatran (Pradaxa), rivaroxaban (Xarelto), or apixaban (Eliquis)   []   Severe stroke (NIHSS > 25).   []   History of BOTH diabetes and previous ischemic stroke.   []   The risks and benefits have been discussed with the patient or family related to the administration of IV Alteplase for stroke symptoms.   []   I have discussed and reviewed the patient's case and imaging with the attending prior to IV Alteplase.   0425 Time Alteplase administered       No past medical history on file.  No past surgical history on file.  No family history on file.     Not on File  Prior to Admission medications    Not on File       Hospital Meds:  Scheduled-   alteplase 0.81 mg/kg Intravenous Once   Morphine 2 mg Intravenous Once   sodium chloride 100 mL/hr Intravenous Once     Infusions-     PRNs- sodium chloride    Functional Status Prior to Current Stroke/P[place] Level Score: 0    NIH Stroke Scale  Time: 04:05  Person Administering Scale: TOBIAS Wu    1a  Level of consciousness: 0=alert; keenly responsive   1b. LOC questions:  0=Performs both tasks correctly   1c. LOC commands: 0=Performs both tasks correctly   2.  Best Gaze: 0=normal   3.  Visual: 0=No visual loss   4. Facial Palsy: 2=Partial paralysis (total or near total paralysis of the lower face)   5a.  Motor left arm: 2=Some effort against gravity, limb cannot get to or maintain (if cured) 90 (or 45) degrees, drifts down to bed, but has some effort against gravity   5b.  Motor right arm: 0=No drift, limb holds 90 (or 45) degrees for full 10 seconds   6a. motor left le=Some effort against gravity, limb cannot get to or maintain (if cured) 90 (or 45) degrees, drifts down to bed, but  has some effort against gravity   6b  Motor right le=No drift, limb holds 90 (or 45) degrees for full 10 seconds   7. Limb Ataxia: 0=Absent   8.  Sensory: 1=Mild to moderate sensory loss; patient feels pinprick is less sharp or is dull on the affected side; there is a loss of superficial pain with pinprick but patient is aware He is being touched   9. Best Language:  0=No aphasia, normal   10. Dysarthria: 1=Mild to moderate, patient slurs at least some words and at worst, can be understood with some difficulty   11. Extinction and Inattention: 0=No abnormality    Total:   8       Results Reviewed:  I have personally reviewed current lab, radiology, and data and agree with results.  Lab Results (last 24 hours)     Procedure Component Value Units Date/Time    CBC & Differential [[id number]] Collected:  20    Specimen:  Blood Updated:  20    Narrative:       The following orders were created for panel order CBC & Differential.  Procedure                               Abnormality         Status                     ---------                               -----------         ------                     CBC Auto Differential[[id number]]                            In process                   Please view results for these tests on the individual orders.    Lavender Top [[id number]] Collected:  20    Specimen:  Blood Updated:  20    CBC Auto Differential [[id number]] Collected:  20    Specimen:  Blood Updated:  20    aPTT [[id number]] Collected:  20    Specimen:  Blood Updated:  20    Light Blue Top [[id number]] Collected:  20    Specimen:  Blood Updated:  20    [place] Draw [[id number]] Collected:  20    Specimen:  Blood Updated:  20    Narrative:       The following orders were created for panel order [place] Draw.  Procedure                               Abnormality         Status                      ---------                               -----------         ------                     Light Blue Top[[id number]]                                   In process                 Green Top (Gel)[[id number]]                                  In process                 Lavender Top[[id number]]                                     In process                 Gold Top - SST[[id number]]                                   In process                   Please view results for these tests on the individual orders.    Troponin [[id number]] Collected:  06/08/20 0411    Specimen:  Blood Updated:  06/08/20 0436    AST [[id number]] Collected:  06/08/20 0411    Specimen:  Blood Updated:  06/08/20 0436    ALT [[id number]] Collected:  06/08/20 0411    Specimen:  Blood Updated:  06/08/20 0436    Green Top (Gel) [[id number]] Collected:  06/08/20 0411    Specimen:  Blood Updated:  06/08/20 0436    Gold Top - SST [[id number]] Collected:  06/08/20 0411    Specimen:  Blood Updated:  06/08/20 0436    TSH [[id number]] Collected:  06/08/20 0412    Specimen:  Blood Updated:  06/08/20 0436    Magnesium [[id number]] Collected:  06/08/20 0412    Specimen:  Blood Updated:  06/08/20 0436    T4, Free [[id number]] Collected:  06/08/20 0412    Specimen:  Blood Updated:  06/08/20 0436    Acetaminophen Level [[id number]] Collected:  06/08/20 0412    Specimen:  Blood Updated:  06/08/20 0436    Ethanol [[id number]] Collected:  06/08/20 0412    Specimen:  Blood Updated:  06/08/20 0436    Salicylate Level [[id number]] Collected:  06/08/20 0412    Specimen:  Blood Updated:  06/08/20 0436    POC Creatinine [[id number]]  (Normal) Collected:  06/08/20 0404    Specimen:  Blood Updated:  06/08/20 0427     Creatinine 1.10 mg/dL      Comment: Serial Number: [id number]:  [id number]       POC Surgery Labs [[id number]]  (Abnormal) Collected:  06/08/20 0408    Specimen:  Blood Updated:  06/08/20 0412     Ionized Calcium 1.31 mmol/L      POC Potassium 3.3 mmol/L      Sodium 142 mmol/L       Total CO2 31 mmol/L      Hemoglobin 13.9 g/dL      Hematocrit 41 %      pCO2, Arterial 50.2 mm Hg      pO2, Arterial 28 mmHg      Comment: Serial Number: [id number]:  [id number]        Base Excess 5.0000 mmol/L      O2 Saturation, Arterial 51 %      pH, Arterial 7.38 pH units      HCO3, Arterial 29.8 mmol/L     POC Protime / INR [[id number]]  (Abnormal) Collected:  06/08/20 0404    Specimen:  Blood Updated:  06/08/20 0408     Protime 12.3 seconds      INR 1.0     Comment: Serial Number: [id number]:  [id number]           Imaging Results (Last 24 Hours)     Procedure Component Value Units Date/Time    CT Angiogram Head [[id number]] Collected:  06/08/20 0433     Updated:  06/08/20 0435    Narrative:       CT ANGIOGRAM, HEAD AND NECK, 6/8/2020    HISTORY:  54-year-old male in the ED with newly noted left side facial droop and left-sided weakness tonight. Last known well at 0100 hours.    TECHNIQUE:  CT angiogram of the head and neck with contrast. 3-D postprocessing was performed and reviewed. Evaluation for a significant carotid arterial stenosis is based on NASCET criteria. Radiation dose reduction techniques included automated exposure control.  Radiation audit for known CT and nuclear cardiology exams in the last 12 months: 2.    COMPARISON:  CT head and CT perfusion exam tonight.    CTA NECK:  Normal aortic arch branching pattern with no proximal great vessel stenosis. The vertebral arteries are widely patent with slight left dominant six. Cervical carotid bifurcations are normal, without evidence of carotid plaque and 0% stenosis in both  internal carotid arteries by NASCET criteria.    CTA HEAD:  Intracranially, there is symmetric distal vascular contrast distribution in the anterior, middle and posterior cerebral artery territories. No compelling evidence of intracranial arterial flow limiting stenosis. No intracranial aneurysm or vascular  malformation is identified. The dural venous sinuses appear normal. No  intracranial mass or abnormal contrast enhancement.      Impression:       Negative CT angiogram of the head and neck. No intracranial or extracranial arterial flow limiting stenosis or aneurysm. Widely patent cervical carotid bifurcations bilaterally.    Signer Name: Charlie Hardy MD   Signed: 6/8/2020 4:33 AM   Workstation Name: [id number]    [facility]    CT Angiogram Neck [[id number]] Collected:  06/08/20 0433     Updated:  06/08/20 0435    Narrative:       CT ANGIOGRAM, HEAD AND NECK, 6/8/2020    HISTORY:  54-year-old male in the ED with newly noted left side facial droop and left-sided weakness tonight. Last known well at 0100 hours.    TECHNIQUE:  CT angiogram of the head and neck with contrast. 3-D postprocessing was performed and reviewed. Evaluation for a significant carotid arterial stenosis is based on NASCET criteria. Radiation dose reduction techniques included automated exposure control.  Radiation audit for known CT and nuclear cardiology exams in the last 12 months: 2.    COMPARISON:  CT head and CT perfusion exam tonight.    CTA NECK:  Normal aortic arch branching pattern with no proximal great vessel stenosis. The vertebral arteries are widely patent with slight left dominant six. Cervical carotid bifurcations are normal, without evidence of carotid plaque and 0% stenosis in both  internal carotid arteries by NASCET criteria.    CTA HEAD:  Intracranially, there is symmetric distal vascular contrast distribution in the anterior, middle and posterior cerebral artery territories. No compelling evidence of intracranial arterial flow limiting stenosis. No intracranial aneurysm or vascular  malformation is identified. The dural venous sinuses appear normal. No intracranial mass or abnormal contrast enhancement.      Impression:       Negative CT angiogram of the head and neck. No intracranial or extracranial arterial flow limiting stenosis or aneurysm. Widely patent  cervical carotid bifurcations bilaterally.    Signer Name: Charlie Hardy MD   Signed: 6/8/2020 4:33 AM   Workstation Name: [facility]    [facility]    CT Cerebral Perfusion With & Without Contrast [[id number]] Collected:  06/08/20 0426     Updated:  06/08/20 0428    Narrative:       CT CEREBRAL PERFUSION, WITH AND WITHOUT CONTRAST, 6/8/2020    HISTORY:   54-year-old male in the ED with newly noted left side facial droop and left-sided weakness. Last known well at 0100 hours.    TECHNIQUE:   Axial CT images of the brain without and with intravenous contrast using cerebral perfusion protocol. Post-processing parametric maps were created using RAPID software and reviewed. Radiation dose reduction techniques included automated exposure control  or exposure modulation based on body size. CT and nuclear cardiology exams in the last 12 months: 0.    COMPARISON:   CT head, 6/8/2020.    FINDINGS:   Arterial input function is optimal.     Perfusion maps are symmetric without evidence of cerebral ischemia or core infarction.      Impression:       Negative CT perfusion study. No evidence of cerebral ischemia or core infarction.        Signer Name: Charlie Hardy MD   Signed: 6/8/2020 4:26 AM   Workstation [facility]: [email or web address]    [facility]    CT Angiogram Chest [[id number]] Resulted:  06/08/20 0425     Updated:  06/08/20 0427    CT Head Without Contrast Stroke Protocol [[id number]] Collected:  06/08/20 0409     Updated:  06/08/20 0411    Narrative:       CT HEAD, NONCONTRAST, STROKE PROTOCOL, 6/8/2020    HISTORY:  54-year-old male in the ED with newly noted left side facial droop and left-sided weakness. Last known well at 0100 hours.    TECHNIQUE:  CT imaging of the head without IV contrast. Radiation dose reduction techniques included automated exposure control. Radiation audit for CT and nuclear cardiology exams in the last 12 months: 0.  *  CT exam time, 0400.  *   CT images on line for interpretation, 04:05.  *  Stat verbal interpretation to the treatment team, 04:07.    FINDINGS:  No acute intracranial abnormality is demonstrated.    Mild generalized cerebral volume loss. No evidence of intracranial hemorrhage, mass, mass effect, cerebral edema, extra-axial fluid collection or hydrocephalus.    Extensive bilateral paranasal sinus disease with near complete opacification of frontal, ethmoid, maxillary and sphenoid sinuses. Mastoid air spaces are grossly clear.      Impression:       1.  No acute intracranial abnormality.  2.  Extensive bilateral paranasal sinus costal disease.    NOTIFICATION: Critical Value/emergent results were relayed from me to the ED treatment team by telephone through the CT technologist, Stoney, at 04:07 on 6/8/2020.    Signer Name: Charlie Hardy MD   Signed: 6/8/2020 4:09 AM   Workstation Name: [email or web address]    [facility]             Assessment/Plan:      1. Left sided weakness/facial droop and slurred speech- r/o stroke  2. Admit to the ICU  3. Ischemic Stroke with thrombolytic therapy order set  4. Repeat CTH tomorrow am @ 420  5. MRI today  6. ASA s/p negative 24hr CTH  7. Statin  8. SLP/PT/OT  9. Maintain SBP <180; Cardene prn SBP >180      TOBIAS Wu, [facility]-BC, Stroke Navigator  June 8, 2020  4:42 AM

## 2020-06-08 NOTE — PLAN OF CARE
Problem: Patient Care Overview  Goal: Plan of Care Review  Outcome: Ongoing (interventions implemented as appropriate)  Flowsheets (Taken 6/8/2020 1017)  Plan of Care Reviewed With: patient  Note:   SLP evaluation completed. Will continue to address dysphagia/cognitive communication w/ treatment. Please see note for further details and recommendations.

## 2020-06-08 NOTE — PLAN OF CARE
Problem: Patient Care Overview  Goal: Plan of Care Review  6/8/2020 1719 by Leatha Howe, RN  Outcome: Ongoing (interventions implemented as appropriate)  6/8/2020 1719 by Leatha Howe, RN  Outcome: Ongoing (interventions implemented as appropriate)  Flowsheets (Taken 6/8/2020 1719)  Plan of Care Reviewed With: patient;spouse   Patient alert and oriented. Still has weakness on left arm and leg. Slight lt facial droop remains. Speech still alittle slurred. Patient on RA sat 95%, however does drop when asleep. Patient eating little.

## 2020-06-08 NOTE — PROGRESS NOTES
"Clinical Nutrition Note      Patient Name: Pernell Adkins  MRN: [id number]  Admission date: 6/8/2020      Multidisciplinary Rounds    Additional information obtained during MDR:  Pt adm this morning w/ stroke symptoms of L paresis and facial droop s/p tPA.. Pt w/ chr pain, narc and caffeine abuse. He will get a MRI instead of CT scan at 24 hrs. SLP to eval for diet.    Current diet: Diet Soft Texture; Whole Foods; Thin; Cardiac    Oral Nutrition Supplement:     Pertinent medical data reviewed  No nutrition risk identified on nursing screen; MST score \"0\"    Intervention:  Plan of care and goals reviewed    Monitor:  RD to follow per protocol      Keiry Parikh MS,RD,LD  06/08/20 3:21 PM  Time: 15  mins       "

## 2020-06-08 NOTE — THERAPY EVALUATION
Patient Name: Pernell Adkins  : 1965    MRN: [id number]                              Today's Date: 2020       Admit Date: 2020    Visit Dx:     ICD-10-CM ICD-9-CM   1. Left-sided weakness R53.1 728.87   2. Dysarthria R47.1 784.51   3. Acute ischemic stroke (CMS/HCC) I63.9 434.91   4. Hypokalemia E87.6 276.8   5. Chest pain, unspecified type R07.9 786.50   6. 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 No pertinent surgical history.  General Information     Row Name 20 1534          PT Evaluation Time/Intention    Document Type  evaluation  -KG     Mode of Treatment  physical therapy  -KG     Row Name 20 1534          General Information    Patient Profile Reviewed?  yes  -KG     Prior Level of Function  independent:;all household mobility;gait;transfer;ADL's;dressing;bathing  -KG     Existing Precautions/Restrictions  fall  -KG     Barriers to Rehab  none identified  -KG     Row Name 20 1534          Relationship/Environment    Lives With  spouse  -KG     Row Name 20 1534          Resource/Environmental Concerns    Current Living Arrangements  home/apartment/condo  -KG     Row Name 20 1534          Home Main Entrance    Number of Stairs, Main Entrance  two  -KG     Stair Railings, Main Entrance  none  -KG     Row Name  06/08/20 1534          Stairs Within Home, Primary    Number of Stairs, Within Home, Primary  none  -KG     Row Name 06/08/20 1534          Cognitive Assessment/Intervention- PT/OT    Orientation Status (Cognition)  oriented x 3  -KG     Row Name 06/08/20 1534          Safety Issues, Functional Mobility    Safety Issues Affecting Function (Mobility)  awareness of need for assistance;insight into deficits/self awareness;safety precaution awareness;safety precautions follow-through/compliance  -KG     Impairments Affecting Function (Mobility)  balance;coordination;endurance/activity tolerance;postural/trunk control;strength  -KG       User Key  (r) = Recorded By, (t) = Taken By, (c) = Cosigned By    Initials Name Provider Type    KG Loli Dee, PT Physical Therapist        Mobility     Row Name 06/08/20 1535          Bed Mobility Assessment/Treatment    Comment (Bed Mobility)  Pt seated EOB upon arrival; UIC at end of treatment.   -KG     Row Name 06/08/20 1535          Transfer Assessment/Treatment    Comment (Transfers)  STS from EOB with blocking of chris knees and B UE support. Pt with intermittent periods of difficulty weight bearing through L LE.   -KG     Row Name 06/08/20 1535          Sit-Stand Transfer    Sit-Stand [place] (Transfers)  moderate assist (50% patient effort);2 person assist;verbal cues  -KG     Assistive Device (Sit-Stand Transfers)  other (see comments) B UE support  -KG     Row Name 06/08/20 1535          Gait/Stairs Assessment/Training    Gait/Stairs Assessment/Training  gait/ambulation independence  -KG     [place] Level (Gait)  moderate assist (50% patient effort);2 person assist;verbal cues  -KG     Assistive Device (Gait)  other (see comments) B UE support   -KG     Distance in Feet (Gait)  4  -KG     Pattern (Gait)  step-to  -KG     Deviations/Abnormal Patterns (Gait)  left sided deviations;antalgic;base of support, narrow;yovany decreased;stride length decreased   -KG     Bilateral Gait Deviations  forward flexed posture;heel strike decreased  -KG     Left Sided Gait Deviations  knee buckling, left side;weight shift ability decreased  -KG     Comment (Gait/Stairs)  Pt ambulated from bed to chair demonstrating slow yovany with short steps. Pt with intermittent periods of L knee buckling, but able to self correct. VC's for upright posture. Pt with periods of lateral leaning to L. Pt able to weight bear equally R and L during transfer to chair.   -KG       User Key  (r) = Recorded By, (t) = Taken By, (c) = Cosigned By    Initials Name Provider Type    KG Loli Dee N, PT Physical Therapist        Obj/Interventions     Row Name 06/08/20 1537          General ROM    GENERAL ROM COMMENTS  BLE WFL   -KG     Row Name 06/08/20 1537          MMT (Manual Muscle Testing)    General MMT Comments  RLE grossly 4/5; L ankle DF, knee ext, hip flex grossly 3/5; inconsistent with functional mobility. Pt grossly 3+/5 bilaterally.   -KG     Row Name 06/08/20 1537          Static Sitting Balance    Level of [place] (Unsupported Sitting, Static Balance)  contact guard assist  -KG     Sitting Position (Unsupported Sitting, Static Balance)  sitting on edge of bed  -KG     Row Name 06/08/20 1537          Static Standing Balance    Level of [place] (Supported Standing, Static Balance)  minimal assist, 75% patient effort;2 person assist  -KG     Assistive Device Utilized (Supported Standing, Static Balance)  other (see comments) B UE support   -KG     Row Name 06/08/20 1537          Dynamic Standing Balance    Level of [place], Reaches Outside Midline (Standing, Dynamic Balance)  moderate assist, 50 to 74% patient effort;2 person assist  -KG     Assistive Device Utilized (Supported Standing, Dynamic Balance)  other (see comments) B UE support  -KG     Row Name 06/08/20 1537          Light Touch Sensation Assessment    Left Lower Extremity: Light Touch Sensation Assessment   absent sensation inconsistent yes and no with light touch  -KG     Right Lower Extremity: Light Touch Sensation Assessment  intact  -KG       User Key  (r) = Recorded By, (t) = Taken By, (c) = Cosigned By    Initials Name Provider Type    Loli Queen, PT Physical Therapist        Goals/Plan     Row Name 06/08/20 1541          Bed Mobility Goal 1 (PT)    Activity/Assistive Device (Bed Mobility Goal 1, PT)  sit to supine;supine to sit  -KG     [place] Level/Cues Needed (Bed Mobility Goal 1, PT)  minimum assist (75% or more patient effort)  -KG     Time Frame (Bed Mobility Goal 1, PT)  2 weeks  -KG     Progress/Outcomes (Bed Mobility Goal 1, PT)  goal ongoing  -KG     Row Name 06/08/20 1541          Transfer Goal 1 (PT)    Activity/Assistive Device (Transfer Goal 1, PT)  sit-to-stand/stand-to-sit;bed-to-chair/chair-to-bed  -KG     [place] Level/Cues Needed (Transfer Goal 1, PT)  minimum assist (75% or more patient effort)  -KG     Time Frame (Transfer Goal 1, PT)  2 weeks  -KG     Progress/Outcome (Transfer Goal 1, PT)  goal ongoing  -KG     Row Name 06/08/20 1541          Gait Training Goal 1 (PT)    Activity/Assistive Device (Gait Training Goal 1, PT)  gait (walking locomotion)  -KG     [place] Level (Gait Training Goal 1, PT)  moderate assist (50-74% patient effort)  -KG     Distance (Gait Goal 1, PT)  50 feet  -KG     Time Frame (Gait Training Goal 1, PT)  2 weeks  -KG     Progress/Outcome (Gait Training Goal 1, PT)  goal ongoing  -KG       User Key  (r) = Recorded By, (t) = Taken By, (c) = Cosigned By    Initials Name Provider Type    Loli Queen, PT Physical Therapist        Clinical Impression     Row Name 06/08/20 1540          Pain Assessment    Additional Documentation  Pain Scale: Numbers Pre/Post-Treatment (Group)  -KG     Row Name 06/08/20 1540          Pain Scale: Numbers Pre/Post-Treatment    Pain Scale: Numbers, Pretreatment  0/10 - no pain  -KG     Pain Scale:  Numbers, Post-Treatment  0/10 - no pain  -KG     Row Name 06/08/20 1540          Physical Therapy Clinical Impression    Patient/Family Goals Statement (PT Clinical Impression)  return to PLOF  -KG     Criteria for Skilled Interventions Met (PT Clinical Impression)  yes;treatment indicated  -KG     Rehab Potential (PT Clinical Summary)  good, to achieve stated therapy goals  -KG     Row Name 06/08/20 1540          Vital Signs    Pre Systolic BP Rehab  104  -KG     Pre Treatment Diastolic BP  70  -KG     Post Systolic BP Rehab  128  -KG     Post Treatment Diastolic BP  82  -KG     Pretreatment Heart Rate (beats/min)  76  -KG     Posttreatment Heart Rate (beats/min)  79  -KG     Pre SpO2 (%)  96  -KG     O2 Delivery Pre Treatment  room air  -KG     Post SpO2 (%)  97  -KG     O2 Delivery Post Treatment  room air  -KG     Pre Patient Position  Sitting  -KG     Intra Patient Position  Standing  -KG     Post Patient Position  Sitting  -KG     Row Name 06/08/20 1540          Positioning and Restraints    Pre-Treatment Position  in bed  -KG     Post Treatment Position  chair  -KG     In Chair  notified nsg;reclined;call light within reach;encouraged to call for assist;exit alarm on;RUE elevated;LUE elevated;waffle cushion;on mechanical lift sling;legs elevated  -KG       User Key  (r) = Recorded By, (t) = Taken By, (c) = Cosigned By    Initials Name Provider Type    KG Loli Dee, PT Physical Therapist        Outcome Measures     Row Name 06/08/20 1542          How much help from another person do you currently need...    Turning from your back to your side while in flat bed without using bedrails?  3  -KG     Moving from lying on back to sitting on the side of a flat bed without bedrails?  2  -KG     Moving to and from a bed to a chair (including a wheelchair)?  2  -KG     Standing up from a chair using your arms (e.g., wheelchair, bedside chair)?  2  -KG     Climbing 3-5 steps with a railing?  1  -KG     To  walk in hospital room?  2  -KG     AM-PAC 6 Clicks Score (PT)  12  -KG     Row Name 06/08/20 1542          Modified [place] Scale    Pre-Stroke Modified [place] Scale  0 - No Symptoms at all.  -KG     Modified [place] Scale  4 - Moderately severe disability.  Unable to walk without assistance, and unable to attend to own bodily needs without assistance.  -KG     Row Name 06/08/20 1542          Functional Assessment    Outcome Measure Options  AM-PAC 6 Clicks Basic Mobility (PT);Modified [place]  -KG       User Key  (r) = Recorded By, (t) = Taken By, (c) = Cosigned By    Initials Name Provider Type    KG Loli Dee, PT Physical Therapist        Physical Therapy Education                 Title: PT OT SLP Therapies (In Progress)     Topic: Physical Therapy (In Progress)     Point: Mobility training (In Progress)     Description:   Instruct learner(s) on safety and technique for assisting patient out of bed, chair or wheelchair.  Instruct in the proper use of assistive devices, such as walker, crutches, cane or brace.              Patient Friendly Description:   It's important to get you on your feet again, but we need to do so in a way that is safe for you. Falling has serious consequences, and your personal safety is the most important thing of all.        When it's time to get out of bed, one of us or a family member will sit next to you on the bed to give you support.     If your doctor or nurse tells you to use a walker, crutches, a cane, or a brace, be sure you use it every time you get out of bed, even if you think you don't need it.    Learning Progress Summary           Patient Acceptance, E, NR by KG at 6/8/2020 1415                   Point: Home exercise program (Not Started)     Description:   Instruct learner(s) on appropriate technique for monitoring, assisting and/or progressing patient with therapeutic exercises and activities.              Learner Progress:   Not documented in this visit.           Point: Body mechanics (In Progress)     Description:   Instruct learner(s) on proper positioning and spine alignment for patient and/or caregiver during mobility tasks and/or exercises.              Learning Progress Summary           Patient Acceptance, E, NR by KG at 6/8/2020 1415                   Point: Precautions (In Progress)     Description:   Instruct learner(s) on prescribed precautions during mobility and gait tasks              Learning Progress Summary           Patient Acceptance, E, NR by KG at 6/8/2020 1415                               User Key     Initials Effective Dates Name Provider Type Discipline    KG 05/22/20 -  Loli Dee, PT Physical Therapist PT              PT Recommendation and Plan  Planned Therapy Interventions (PT Eval): balance training, bed mobility training, gait training, strengthening, transfer training  Outcome Summary/Treatment Plan (PT)  Anticipated Discharge Disposition (PT): inpatient rehabilitation facility  Plan of Care Reviewed With: patient  Outcome Summary: PT initial evaluation completed for pt presenting with L sided weakness, impaired balance, and decreased functional mobility. Pt required modA x2 for STS transfers and to ambulate from bed to chair. Pt's decreased independence warrants PT skilled care. Recommend D/C to  rehab facility.     Time Calculation:   PT Charges     Row Name 06/08/20 1415             Time Calculation    Start Time  1415  -KG      PT Received On  06/08/20  -KG      PT Goal Re-Cert Due Date  06/18/20  -KG        User Key  (r) = Recorded By, (t) = Taken By, (c) = Cosigned By    Initials Name Provider Type    KG Loli Dee, PT Physical Therapist        Therapy Charges for Today     Code Description Service Date Service Provider Modifiers Qty    02720160521 HC PT EVAL MOD COMPLEXITY 4 6/8/2020 Loli Dee, PT GP 1          PT G-Codes  Outcome Measure Options: AM-PAC 6 Clicks Basic Mobility (PT), Modified  [place]  AM-PAC 6 Clicks Score (PT): 12  AM-PAC 6 Clicks Score (OT): 12  Modified [place] Scale: 4 - Moderately severe disability.  Unable to walk without assistance, and unable to attend to own bodily needs without assistance.    Sabrina Dee, PT  6/8/2020

## 2020-06-08 NOTE — PLAN OF CARE
Problem: Patient Care Overview  Goal: Plan of Care Review  Outcome: Ongoing (interventions implemented as appropriate)  Flowsheets (Taken 6/8/2020 5912)  Progress: improving  Plan of Care Reviewed With: patient  Outcome Summary: OT completed a brief chart review. Pt Mod Ax2 for bed mobility and t/fs, limited d/t c/o weakness. Recommend cont skilled IPOT POC. Recommend pt DC to IP rehab based on current level of performance.

## 2020-06-08 NOTE — PLAN OF CARE
Problem: Patient Care Overview  Goal: Plan of Care Review  Outcome: Ongoing (interventions implemented as appropriate)  Flowsheets (Taken 6/8/2020 8427)  Plan of Care Reviewed With: patient  Outcome Summary: PT initial evaluation completed for pt presenting with L sided weakness, impaired balance, and decreased functional mobility. Pt required modA x2 for STS transfers and to ambulate from bed to chair. Pt's decreased independence warrants PT skilled care. Recommend D/C to IP rehab facility.

## 2020-06-08 NOTE — PLAN OF CARE
Problem: Patient Care Overview  Goal: Plan of Care Review  Outcome: Ongoing (interventions implemented as appropriate)  Flowsheets (Taken 6/8/2020 7893)  Plan of Care Reviewed With: patient; spouse     Problem: Patient Care Overview  Goal: Individualization and Mutuality  Outcome: Ongoing (interventions implemented as appropriate)     Problem: Stroke (Ischemic) (Adult)  Goal: Signs and Symptoms of Listed Potential Problems Will be Absent, Minimized or Managed (Stroke)  Outcome: Ongoing (interventions implemented as appropriate)     Problem: Thrombolytic Therapy (Adult)  Goal: Signs and Symptoms of Listed Potential Problems Will be Absent, Minimized or Managed (Thrombolytic Therapy)  Outcome: Ongoing (interventions implemented as appropriate)     Problem: Pain, Chronic (Adult)  Goal: Identify Related Risk Factors and Signs and Symptoms  Outcome: Ongoing (interventions implemented as appropriate)     Problem: Skin Injury Risk (Adult)  Goal: Identify Related Risk Factors and Signs and Symptoms  Outcome: Ongoing (interventions implemented as appropriate)     Problem: Fall Risk (Adult)  Goal: Identify Related Risk Factors and Signs and Symptoms  Outcome: Ongoing (interventions implemented as appropriate)

## 2020-06-08 NOTE — CONSULTS
Order noted for diabetes education. Current A1c 5.1%. No history of diabetes noted per chart review. Please re consult if needed. Thank you.

## 2020-06-08 NOTE — H&P
"  CRITICAL CARE ADMISSION NOTE    Chief Complaint     Acute ischemic stroke (CMS/[facility])    History of Present Illness     Kenny Adkins is a 54 y.o. male that presents to [facility] ED on 6/8 with concern for stroke.     According to the son, the patient was in his normal state of health last evening when he developed left-sided weakness, slurred speech, and left facial droop he awoke to these symptons at around 1:00am.  EMS was called noted to be hypoglycemic en route at 50 given D10 and he was brought to our facility for evaluation.     On ED arrival GCS 15 and NIH 8. CTH, CTP, and CTA head/neck negative. There were additional complaints of \"crushing\" chest pain with EKG negative for ischemia and subsequent CTA chest was unremarkable. He was deemed appropriate for thrombolytic therapy which was initiated at 0425.    Patient has no prior history of CVA or neurologic event.    He has a history of chronic and recurrent chest pain.  He has had several heart catheterizations in the past.  The last was in 2015 and was \"okay\".    He has chronic pain and is on Norco 10 4 times a day and Neurontin.  Also has chronic anxiety and is on Xanax daily.  According to his wife he abuses caffeine-based drinks and drinks 6-8 8oz Red Bulls a day.  He has gone through 16 drinks in the 2 days prior to this admission.    He is a non-smoker.  He has a history of chronic persistent asthma and is taken care of at [facility].  He is on Fasenra and had been on Xolair in the past.  He has a history of recurrent respiratory tract infections and is on chronic suppressive azithromycin.  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    Not on File  No current " "facility-administered medications on file prior to encounter.      No current outpatient medications on file prior to encounter.     MEDICATION LIST AND ALLERGIES REVIEWED.    History reviewed. No pertinent family history.  Social History     Tobacco Use   • Smoking status: Not on file   Substance Use Topics   • Alcohol use: Not on file   • Drug use: Not on file     Social History     Social History Narrative   • Not on file     FAMILY AND SOCIAL HISTORY REVIEWED.    Review of Systems  ALL OTHER SYSTEMS REVIEWED AND ARE NEGATIVE.    Physical Exam and Clinical Information   /79   Pulse 97   Temp 98.2 °F (36.8 °C) (Oral)   Resp 24   Ht 177.8 cm (70\")   Wt 89.5 kg (197 lb 5 oz)   SpO2 93%   BMI 28.31 kg/m²   Physical Exam:   GENERAL: Awake, no distress   HEENT: No adenopathy or thyromegaly   LUNGS: Scattered late expiratory wheezes, no rhonchi   HEART: Regular rate and rhythm.  No murmurs.  I can reproduce his chest pain by palpating his left lower costochondral junctions   ABDOMEN: Soft, nontender   EXTREMITIES: No clubbing, cyanosis or edema   NEURO/PSYCH: Awake and alert.  Follows commands.  Has dysarthria and facial droop.  NIH stroke scale of 8    Results from last 7 days   Lab Units 06/08/20  0411 06/08/20  0408   WBC 10*3/mm3 3.73  --    HEMOGLOBIN g/dL 13.6  --    HEMOGLOBIN, POC g/dL  --  13.9   PLATELETS 10*3/mm3 186  --      Results from last 7 days   Lab Units 06/08/20  0412 06/08/20  0404   CREATININE mg/dL  --  1.10   MAGNESIUM mg/dL 2.1  --      Estimated Creatinine Clearance: 84.4 mL/min (by C-G formula based on SCr of 1.1 mg/dL).      Results from last 7 days   Lab Units 06/08/20  0408   PH, ARTERIAL pH units 7.38     No results found for: LACTATE     IMAGES: CT angiogram is clear without evidence of pulmonary emboli, chest trauma, or parenchymal infiltrates    I reviewed the patient's results and images.     Assesment     Active Hospital Problems    Diagnosis   • **R/O AIS    • COPD   • " Hypertension   • Hypokalemia   • Overweight (BMI 25.0-29.9)     Plan/Recommendations     Admit to the intensive care unit  Post TPA CVA stroke order set  Continue some degree of narcotics and watch for narcotic withdrawal  Continue home benzodiazepines and watch for benzodiazepine withdrawal  Curtail caffeine intake  Continue inhaled bronchodilators and ICS    Critical Care time spent in direct patient care: 40 minutes (excluding procedure time, if applicable) including high complexity decision making to assess, manipulate, and support vital organ system failure in this individual who has impairment of one or more vital organ systems such that there is a high probability of imminent or life threatening deterioration in the patient’s condition.    KRISSY Sneed MD  Pulmonary and Critical Care Medicine     CC: Epifanio Shankar MD    Please note that portions of this note were completed with a voice recognition program. Efforts were made to edit the dictations, but occasionally words are mistranscribed.

## 2020-06-08 NOTE — PLAN OF CARE
Problem: Patient Care Overview  Goal: Plan of Care Review  Outcome: Ongoing (interventions implemented as appropriate)  Flowsheets (Taken 6/8/2020 0675)  Progress: declining  Outcome Summary: New neuro changes upon arrival to the unit.  NIH from 8 to 15.  Stat CT complete.  Left side completely flaccid.  Total sensory loss on left side. Pt more drowsy.  VSS.  Will continue to monitor.

## 2020-06-08 NOTE — ED PROVIDER NOTES
"Subjective   54-year-old male presents via EMS for \"possible stroke.\"  The patient was in his normal state of health last night before his symptoms started between 1 and 1:30 AM.  According to his son whom I spoke with over the phone, the patient began exhibiting left-sided weakness, slurred speech, left-sided facial droop at that time.  His son is a pharmacist and was concerned about potential stroke and called EMS.  He was subsequently brought here for further evaluation and treatment.  Additionally, the patient was complaining of left-sided chest pain as well.  No cough.  No fever.  No recent illness.  No known exposures to anyone with a novel coronavirus.  Of note, the patient was noted to be mildly hypoglycemic per EMS with a blood sugar of 50.  He was given D10 on the way here with no change in his symptoms.          Review of Systems   Cardiovascular: Positive for chest pain.   Neurological: Negative for speech difficulty, weakness and numbness.   All other systems reviewed and are negative.      No past medical history on file.    Not on File    No past surgical history on file.    No family history on file.             Objective   Physical Exam   Constitutional: He appears well-developed and well-nourished. No distress.   Nontoxic-appearing male   HENT:   Head: Normocephalic and atraumatic.   Mouth/Throat: Oropharynx is clear and moist.   Neck: Normal range of motion. No JVD present.   Cardiovascular: Normal rate, regular rhythm, normal heart sounds, intact distal pulses and normal pulses. Exam reveals no gallop and no friction rub.   No murmur heard.  Pulmonary/Chest: Effort normal and breath sounds normal. No respiratory distress. He has no wheezes. He has no rales.   Abdominal: Soft. Bowel sounds are normal. He exhibits no distension and no mass. There is no tenderness. There is no guarding.   Musculoskeletal:        Right lower leg: He exhibits no edema.        Left lower leg: He exhibits no edema. " "  Neurological: He is alert.   Patient is awake, alert, and oriented x3, mild dysarthria noted, left-sided facial droop present, decreased strength noted to left upper extremity and left lower extremity when compared to the right, 5 out of 5 strength in right upper and lower extremity, 3+ out of 5 strength in left upper and lower extremity with drift noted, decreased sensation noted to left upper and lower extremity as well as to left face when compared to the right, decreased  strength noted in left hand when compared to right, unable to assess for dysmetria with finger-to-nose and heel-to-shin testing on left side secondary to weakness   Skin: Skin is warm and dry. No rash noted. He is not diaphoretic. No erythema. No pallor.   Psychiatric: He has a normal mood and affect. Judgment and thought content normal.   Nursing note and vitals reviewed.      Critical Care  Performed by: Mayank Roca MD  Authorized by: Mayank Roca MD     Critical care provider statement:     Critical care time (minutes):  38    Critical care was necessary to treat or prevent imminent or life-threatening deterioration of the following conditions:  CNS failure or compromise    Critical care was time spent personally by me on the following activities:  Development of treatment plan with patient or surrogate, discussions with consultants, evaluation of patient's response to treatment, examination of patient, obtaining history from patient or surrogate, ordering and performing treatments and interventions, ordering and review of laboratory studies, ordering and review of radiographic studies, pulse oximetry and re-evaluation of patient's condition               ED Course  ED Course as of Jun 08 0515 Mon Jun 08, 2020   0412 54-year-old male presents via EMS for evaluation of \"possible stroke.\"  According to the patient's son who is a pharmacist and a good historian, between 1 AM and 1:30 AM this morning the patient " developed left-sided facial droop, left-sided weakness, and slurred speech.  He was concerned about potential stroke and called EMS.  The patient was subsequently brought here for evaluation.  Of note, he was noted to be mildly hypoglycemic in route at 50 and was given D10.  On arrival to the ED, patient has clinical presentation highly concerning for stroke.  He has left-sided facial droop with mild dysarthria, left-sided weakness, and decreased sensation to left side when compared to the right.  NIH stroke scale of 9.  CT head negative.  I had a long discussion with the patient's son, and the patient has no contraindications to TPA administration.  After discussing the risks and benefits of TPA, using shared decision making, we elected to proceed with TPA administration.  Further advanced imaging studies are currently pending at this time.    [DD]   0413 The patient also complained of chest pain initially.    [DD]   0414 His initial EKG revealed normal sinus rhythm with a heart rate of 84, mildly prolonged QTc interval, and no ST segments suggestive of or concerning for ischemia.     [DD]   0424 Prior to administration of TPA, I felt it important to obtain a chest CTA given the patient's complaint of chest pain and neurological symptoms to rule out dissection prior to administering TPA.  No evidence of aortic dissection noted on CT scan.    [DD]   0434 CT perfusion is negative.  I discussed the patient's case with Dr. Sneed, the patient will be admitted to the ICU under his care for further evaluation and treatment.  Family is aware/agreeable with the plan.    [DD]   0436 CTA of head and neck negative.    [DD]      ED Course User Index  [DD] Mayank Roca MD                                 Recent Results (from the past 24 hour(s))   POC Protime / INR    Collection Time: 06/08/20  4:04 AM   Result Value Ref Range    Protime 12.3 (L) 12.8 - 15.2 seconds    INR 1.0 0.8 - 1.2   POC Creatinine    Collection  Time: 06/08/20  4:04 AM   Result Value Ref Range    Creatinine 1.10 0.60 - 1.30 mg/dL   POC Surgery Labs    Collection Time: 06/08/20  4:08 AM   Result Value Ref Range    Ionized Calcium 1.31 1.20 - 1.32 mmol/L    POC Potassium 3.3 (L) 3.5 - 4.9 mmol/L    Sodium 142 138 - 146 mmol/L    Total CO2 31 (H) 24 - 29 mmol/L    Hemoglobin 13.9 12.0 - 17.0 g/dL    Hematocrit 41 38 - 51 %    pCO2, Arterial 50.2 (H) 35 - 45 mm Hg    pO2, Arterial 28 (L) 80 - 105 mmHg    Base Excess 5.0000 -5 - 5 mmol/L    O2 Saturation, Arterial 51 (L) 95 - 98 %    pH, Arterial 7.38 7.35 - 7.6 pH units    HCO3, Arterial 29.8 (H) 22 - 26 mmol/L   Troponin    Collection Time: 06/08/20  4:11 AM   Result Value Ref Range    Troponin T <0.010 0.000 - 0.030 ng/mL   AST    Collection Time: 06/08/20  4:11 AM   Result Value Ref Range    AST (SGOT) 17 1 - 40 U/L   ALT    Collection Time: 06/08/20  4:11 AM   Result Value Ref Range    ALT (SGPT) 5 1 - 41 U/L   CBC Auto Differential    Collection Time: 06/08/20  4:11 AM   Result Value Ref Range    WBC 3.73 3.40 - 10.80 10*3/mm3    RBC 4.72 4.14 - 5.80 10*6/mm3    Hemoglobin 13.6 13.0 - 17.7 g/dL    Hematocrit 41.6 37.5 - 51.0 %    MCV 88.1 79.0 - 97.0 fL    MCH 28.8 26.6 - 33.0 pg    MCHC 32.7 31.5 - 35.7 g/dL    RDW 14.4 12.3 - 15.4 %    RDW-SD 46.4 37.0 - 54.0 fl    MPV 10.3 6.0 - 12.0 fL    Platelets 186 140 - 450 10*3/mm3    Neutrophil % 55.4 42.7 - 76.0 %    Lymphocyte % 35.4 19.6 - 45.3 %    Monocyte % 8.6 5.0 - 12.0 %    Eosinophil % 0.0 (L) 0.3 - 6.2 %    Basophil % 0.3 0.0 - 1.5 %    Immature Grans % 0.3 0.0 - 0.5 %    Neutrophils, Absolute 2.07 1.70 - 7.00 10*3/mm3    Lymphocytes, Absolute 1.32 0.70 - 3.10 10*3/mm3    Monocytes, Absolute 0.32 0.10 - 0.90 10*3/mm3    Eosinophils, Absolute 0.00 0.00 - 0.40 10*3/mm3    Basophils, Absolute 0.01 0.00 - 0.20 10*3/mm3    Immature Grans, Absolute 0.01 0.00 - 0.05 10*3/mm3    nRBC 0.0 0.0 - 0.2 /100 WBC   Magnesium    Collection Time: 06/08/20  4:12 AM    Result Value Ref Range    Magnesium 2.1 1.6 - 2.6 mg/dL   Acetaminophen Level    Collection Time: 06/08/20  4:12 AM   Result Value Ref Range    Acetaminophen <5.0 (L) 10.0 - 30.0 mcg/mL   Ethanol    Collection Time: 06/08/20  4:12 AM   Result Value Ref Range    Ethanol <10 0 - 10 mg/dL   Salicylate Level    Collection Time: 06/08/20  4:12 AM   Result Value Ref Range    Salicylate 1.1 <=30.0 mg/dL   POC Glucose Once    Collection Time: 06/08/20  4:53 AM   Result Value Ref Range    Glucose 116 70 - 130 mg/dL     Note: In addition to lab results from this visit, the labs listed above may include labs taken at another facility or during a different encounter within the last 24 hours. Please correlate lab times with ED admission and discharge times for further clarification of the services performed during this visit.    CT Angiogram Chest   Final Result   1.  No evidence of pulmonary embolism or other acute vascular abnormality within the chest.   2.  Mild dependent posterior lung base atelectasis. Lungs otherwise clear.   3.  Noncalcified 6 mm peripheral right lower lobe pulmonary nodule. See below.      Lung nodule management recommendation: Current published guidelines recommend initial follow-up CT in 6-12 months, and again in 18-24 months for uncomplicated pulmonary nodules measuring 6 to 8 mm in low risk patients (Fleischner Society guidelines,   2017).      Signer Name: Charlie Hardy MD    Signed: 6/8/2020 4:59 AM    Workstation Name: [facility]     Radiology [facility]      CT Angiogram Head   Final Result   Negative CT angiogram of the head and neck. No intracranial or extracranial arterial flow limiting stenosis or aneurysm. Widely patent cervical carotid bifurcations bilaterally.      Signer Name: Charlie Hardy MD    Signed: 6/8/2020 4:33 AM    [facility]     Radiology [facility]      CT Angiogram Neck   Final Result   Negative CT angiogram of  the head and neck. No intracranial or extracranial arterial flow limiting stenosis or aneurysm. Widely patent cervical carotid bifurcations bilaterally.      Signer Name: Charlie Hardy MD    Signed: 6/8/2020 4:33 AM    Workstation Name: [facility]     [facility]      CT Cerebral Perfusion With & Without Contrast   Final Result   Negative CT perfusion study. No evidence of cerebral ischemia or core infarction.            Signer Name: Charlie Hardy MD    Signed: 6/8/2020 4:26 AM    Workstation Name: [facility]     Radiology [facility]      CT Head Without Contrast Stroke Protocol   Final Result   1.  No acute intracranial abnormality.   2.  Extensive bilateral paranasal sinus costal disease.      NOTIFICATION: Critical Value/emergent results were relayed from me to the ED treatment team by telephone through the CT technologist, Stoney, at 04:07 on 6/8/2020.      Signer Name: Charlie Hardy MD    Signed: 6/8/2020 4:09 AM    Workstation Name: [facility]     Radiology [facility]        Vitals:    06/08/20 0416 06/08/20 0419 06/08/20 0442 06/08/20 0455   BP:   134/72 129/79   BP Location:       Patient Position:       Pulse: 70  80 97   Resp: 24      Temp:  98.2 °F (36.8 °C)     TempSrc:  Oral     SpO2: 93%  94% 93%   Weight:       Height:         Medications   sodium chloride 0.9 % flush 10 mL (has no administration in time range)   alteplase (ACTIVASE) 100 mg kit (72.5 mg Intravenous New Bag 6/8/20 0426)   sodium chloride 0.9 % infusion (has no administration in time range)   potassium chloride (MICRO-K) CR capsule 40 mEq (has no administration in time range)     Or   potassium chloride (KLOR-CON) packet 40 mEq (has no administration in time range)     Or   potassium chloride 10 mEq in 100 mL IVPB (has no administration in time range)   iopamidol (ISOVUE-370) 76 % injection 150 mL (115 mL Intravenous Given 6/8/20 0413)   alteplase (ACTIVASE)  bolus from vial (8.06 mg Intravenous Given 6/8/20 0425)   iopamidol (ISOVUE-370) 76 % injection 100 mL (100 mL Intravenous Given 6/8/20 0422)   morphine injection 2 mg (2 mg Intravenous Given 6/8/20 0444)     ECG/EMG Results (last 24 hours)     Procedure Component Value Units Date/Time    ECG 12 Lead [[id number]] Collected:  06/08/20 0408     Updated:  06/08/20 0409        ECG 12 Lead                       MDM    Final diagnoses:   Left-sided weakness   Dysarthria   Acute ischemic stroke (CMS/HCC)   Hypokalemia   Chest pain, unspecified type            Mayank Roca MD  06/08/20 8300

## 2020-06-08 NOTE — THERAPY EVALUATION
Acute Care - Occupational Therapy Initial Evaluation  [facility]     Patient Name: Pernell Adkins  : 1965  MRN: [id number]  Today's Date: 2020             Admit Date: 2020       ICD-10-CM ICD-9-CM   1. Left-sided weakness R53.1 728.87   2. Dysarthria R47.1 784.51   3. Acute ischemic stroke (CMS/HCC) I63.9 434.91   4. Hypokalemia E87.6 276.8   5. Chest pain, unspecified type R07.9 786.50   6. 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       OT ASSESSMENT FLOWSHEET (last 12 hours)      Occupational Therapy Evaluation     Row Name 20 1358                   OT Evaluation Time/Intention    Subjective Information  complains of;pain  -CL        Document Type  evaluation  -CL        Mode of Treatment  occupational therapy  -CL        Patient Effort  good  -CL        Symptoms Noted During/After Treatment  fatigue  -CL           General Information    Patient Profile Reviewed?  yes  -CL        Prior Level of Function  independent:;all household mobility;transfer;ADL's;dressing;bathing  -CL        Equipment Currently Used at Home  none  -CL        Existing Precautions/Restrictions  fall  -CL        Barriers to Rehab  none identified  -CL           Relationship/Environment    Lives With  spouse  -CL           Resource/Environmental Concerns     Current Living Arrangements  home/apartment/condo  -CL           Home Main Entrance    Number of Stairs, Main Entrance  two  -CL           Cognitive Assessment/Interventions    Additional Documentation  Cognitive Assessment/Intervention (Group)  -CL           Cognitive Assessment/Intervention- PT/OT    Affect/Mental Status (Cognitive)  WFL  -CL        Orientation Status (Cognition)  oriented x 3  -CL        Follows Commands (Cognition)  WFL  -CL        Cognitive Function (Cognitive)  WFL  -CL           Bed Mobility Assessment/Treatment    Bed Mobility Assessment/Treatment  supine-sit  -CL        Supine-Sit [place] (Bed Mobility)  moderate assist (50% patient effort);2 person assist;verbal cues  -CL        Assistive Device (Bed Mobility)  bed rails;draw sheet;head of bed elevated  -CL           Transfer Assessment/Treatment    Transfer Assessment/Treatment  sit-stand transfer;stand-sit transfer  -CL        Comment (Transfers)  Pt stood from EOB w/ BUE support.   -CL           Sit-Stand Transfer    Sit-Stand [place] (Transfers)  moderate assist (50% patient effort);2 person assist;verbal cues  -CL           Stand-Sit Transfer    Stand-Sit [place] (Transfers)  moderate assist (50% patient effort);2 person assist;verbal cues  -CL           ADL Assessment/Intervention    BADL Assessment/Intervention  lower body dressing  -CL           Lower Body Dressing Assessment/Training    Lower Body Dressing [place] Level  don;socks;dependent (less than 25% patient effort)  -CL        Lower Body Dressing Position  supine  -CL           General ROM    GENERAL ROM COMMENTS  BUE grossly WFL  -CL           MMT (Manual Muscle Testing)    General MMT Comments  Pt RUE grossly WFL, LUE shldr F 2-/5, bicep/tricep 3/5, and  3+/5, though inconsistent w/ functional performance  -CL           Motor Assessment/Interventions    Additional Documentation  Balance (Group);Therapeutic Exercise (Group)  -CL           Balance     Balance  static sitting balance;static standing balance  -CL           Static Sitting Balance    Level of [place] (Unsupported Sitting, Static Balance)  contact guard assist  -CL        Sitting Position (Unsupported Sitting, Static Balance)  sitting on edge of bed  -CL           Static Standing Balance    Level of [place] (Supported Standing, Static Balance)  minimal assist, 75% patient effort;2 person assist  -CL           Sensory Assessment/Intervention    Sensory General Assessment  -- Inconsistent formal assessment  -CL           Positioning and Restraints    Pre-Treatment Position  in bed  -CL        Post Treatment Position  chair  -CL        In Chair  notified nsg;reclined;call light within reach;encouraged to call for assist;exit alarm on;with PT;RUE elevated;LUE elevated;waffle cushion;on mechanical lift sling;legs elevated  -CL           Pain Assessment    Additional Documentation  Pain Scale 2: FACES Pre/Post-Treatment (Group)  -CL           Pain Scale 2: FACES Pre/Post-Treatment    Pain 2: FACES Scale, Pretreatment  2-->hurts little bit  -CL        Pain 2: FACES Scale, Post-Treatment  2-->hurts little bit  -CL        Pain Location 2 - Side  Left  -CL        Pain Location 2  shoulder  -CL        Pre/Post Treatment Pain 2 Comment  tolerated  -CL        Pain Intervention(s) 2  Repositioned;Ambulation/increased activity  -CL           Clinical Impression (OT)    OT Diagnosis  Decreased independence in ADLs.   -CL        Patient/Family Goals Statement (OT Eval)  Return to PLOF  -CL        Criteria for Skilled Therapeutic Interventions Met (OT Eval)  yes;treatment indicated  -CL        Rehab Potential (OT Eval)  good, to achieve stated therapy goals  -CL        Therapy Frequency (OT Eval)  daily  -CL        Anticipated Equipment Needs at Discharge (OT)  -- TBA further  -CL        Anticipated Discharge Disposition (OT)  inpatient rehabilitation facility  -CL           Vital Signs    Pre Systolic BP  Rehab  104  -CL        Pre Treatment Diastolic BP  70  -CL        Post Systolic BP Rehab  -- w/ PT  -CL        Pretreatment Heart Rate (beats/min)  73  -CL        Pre SpO2 (%)  93  -CL        O2 Delivery Pre Treatment  room air  -CL        Pre Patient Position  Supine  -CL        Intra Patient Position  Standing  -CL        Post Patient Position  Sitting  -CL           OT Goals    Transfer Goal Selection (OT)  transfer, OT goal 1  -CL        Dressing Goal Selection (OT)  dressing, OT goal 1  -CL        Toileting Goal Selection (OT)  toileting, OT goal 1  -CL        Strength Goal Selection (OT)  strength, OT goal 1  -CL        Additional Documentation  Strength Goal Selection (OT) (Row)  -CL           Transfer Goal 1 (OT)    Activity/Assistive Device (Transfer Goal 1, OT)  sit-to-stand/stand-to-sit;toilet  -CL        [place] Level/Cues Needed (Transfer Goal 1, OT)  contact guard assist  -CL        Time Frame (Transfer Goal 1, OT)  long term goal (LTG);10 days  -CL        Progress/Outcome (Transfer Goal 1, OT)  goal ongoing  -CL           Dressing Goal 1 (OT)    Activity/Assistive Device (Dressing Goal 1, OT)  lower body dressing don/doff socks w/ AAD  -CL        [place]/Cues Needed (Dressing Goal 1, OT)  minimum assist (75% or more patient effort);verbal cues required  -CL        Time Frame (Dressing Goal 1, OT)  long term goal (LTG);10 days  -CL        Progress/Outcome (Dressing Goal 1, OT)  goal ongoing  -CL           Toileting Goal 1 (OT)    Activity/Device (Toileting Goal 1, OT)  adjust/manage clothing;perform perineal hygiene  -CL        [place] Level/Cues Needed (Toileting Goal 1, OT)  supervision required  -CL        Time Frame (Toileting Goal 1, OT)  long term goal (LTG);10 days  -CL        Progress/Outcome (Toileting Goal 1, OT)  goal ongoing  -CL           Strength Goal 1 (OT)    Strength Goal 1 (OT)  Pt will tolerate BUE AROM HEP x10 reps.   -CL        Time Frame (Strength Goal 1, OT)  long  term goal (LTG);10 days  -CL        Progress/Outcome (Strength Goal 1, OT)  goal ongoing  -CL           Living Environment    Home Accessibility  stairs to enter home  -CL          User Key  (r) = Recorded By, (t) = Taken By, (c) = Cosigned By    Initials Name Effective Dates    CL Quynh Díaz OT 04/03/18 -          Occupational Therapy Education                 Title: PT OT SLP Therapies (In Progress)     Topic: Occupational Therapy (In Progress)     Point: ADL training (In Progress)     Description:   Instruct learner(s) on proper safety adaptation and remediation techniques during self care or transfers.   Instruct in proper use of assistive devices.              Learning Progress Summary           Patient Acceptance, E, NR by CL at 6/8/2020 1358                   Point: Home exercise program (Not Started)     Description:   Instruct learner(s) on appropriate technique for monitoring, assisting and/or progressing therapeutic exercises/activities.              Learner Progress:   Not documented in this visit.          Point: Precautions (In Progress)     Description:   Instruct learner(s) on prescribed precautions during self-care and functional transfers.              Learning Progress Summary           Patient Acceptance, E, NR by CL at 6/8/2020 1358                   Point: Body mechanics (In Progress)     Description:   Instruct learner(s) on proper positioning and spine alignment during self-care, functional mobility activities and/or exercises.              Learning Progress Summary           Patient Acceptance, E, NR by CL at 6/8/2020 1358                               User Key     Initials Effective Dates Name Provider Type Discipline     04/03/18 -  Quynh Díaz OT Occupational Therapist OT                  OT Recommendation and Plan  Outcome Summary/Treatment Plan (OT)  Anticipated Equipment Needs at Discharge (OT): (TBA further)  Anticipated Discharge Disposition (OT): inpatient rehabilitation  facility  Therapy Frequency (OT Eval): daily  Plan of Care Review  Plan of Care Reviewed With: patient  Plan of Care Reviewed With: patient  Outcome Summary: OT completed a brief chart review. Pt Mod Ax2 for bed mobility and t/fs, limited d/t c/o weakness. Recommend cont skilled IPOT POC. Recommend pt DC to IP rehab based on current level of performance.    Outcome Measures     Row Name 06/08/20 1358             How much help from another is currently needed...    Putting on and taking off regular lower body clothing?  2  -CL      Bathing (including washing, rinsing, and drying)  2  -CL      Toileting (which includes using toilet bed pan or urinal)  2  -CL      Putting on and taking off regular upper body clothing  2  -CL      Taking care of personal grooming (such as brushing teeth)  2  -CL      Eating meals  2  -CL      AM-PAC 6 Clicks Score (OT)  12  -CL         Modified [place] Scale    Pre-Stroke Modified [place] Scale  0 - No Symptoms at all.  -CL      Modified [place] Scale  4 - Moderately severe disability.  Unable to walk without assistance, and unable to attend to own bodily needs without assistance.  -CL         Functional Assessment    Outcome Measure Options  AM-PAC 6 Clicks Daily Activity (OT);Modified [place]  -CL        User Key  (r) = Recorded By, (t) = Taken By, (c) = Cosigned By    Initials Name Provider Type    Quynh Mistry OT Occupational Therapist          Time Calculation:   Time Calculation- OT     Row Name 06/08/20 1358             Time Calculation- OT    OT Start Time  1358  -CL      OT Received On  06/08/20  -CL      OT Goal Re-Cert Due Date  06/18/20  -CL        User Key  (r) = Recorded By, (t) = Taken By, (c) = Cosigned By    Initials Name Provider Type    Quynh Mistry OT Occupational Therapist        Therapy Charges for Today     Code Description Service Date Service Provider Modifiers Qty    [id number] HC OT EVAL LOW COMPLEXITY 4 6/8/2020 Quynh Díaz OT GO 1                Quynh Díaz, OT  6/8/2020

## 2020-06-08 NOTE — THERAPY EVALUATION
Acute Care - Speech Language Pathology Initial Evaluation  [facility]   Cognitive-Communication Evaluation  Clinical Swallow Evaluation       Patient Name: Pernell Adkins  : 1965  MRN: [id number]  Today's Date: 2020               Admit Date: 2020     Visit Dx:    ICD-10-CM ICD-9-CM   1. Left-sided weakness R53.1 728.87   2. Dysarthria R47.1 784.51   3. Acute ischemic stroke (CMS/[facility]) I63.9 434.91   4. Hypokalemia E87.6 276.8   5. Chest pain, unspecified type R07.9 786.50   6. 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     SLP EVALUATION (last 72 hours)      SLP SLC Evaluation     Row Name 20 0945                   Communication Assessment/Intervention    Document Type  evaluation  -VO        Subjective Information  complains of;pain shoulder  -VO        Patient Observations  alert;cooperative  -VO        Patient/Family Observations  Pt began to experience reported 10/10 shoulder pain during exam. RN notified.  -VO        Patient Effort  good  -VO           General Information    Patient Profile Reviewed  yes  -VO        Pertinent History Of Current Problem  adm CVA w/u w/ L wkns. CTH negative, no MRI yet. Hx COPD.  -VO        Precautions/Limitations, Vision  vision impairment, bilaterally;other (see comments) reported blurry  vision in both eyes  -VO        Precautions/Limitations, Hearing  WFL;for purposes of eval  -VO        Prior Level of Function-Communication  WFL  -VO        Plans/Goals Discussed with  patient;agreed upon  -VO        Barriers to Rehab  none identified  -VO        Patient's Goals for Discharge  return to home  -VO           Pain Assessment    Additional Documentation  Pain Scale: Numbers Pre/Post-Treatment (Group)  -VO           Pain Scale: Numbers Pre/Post-Treatment    Pain Scale: Numbers, Pretreatment  0/10 - no pain  -VO        Pain Scale: Numbers, Post-Treatment  10/10  -VO        Pain Location - Side  Left  -VO        Pain Location  shoulder  -VO        Pre/Post Treatment Pain Comment  repositioned and notified RN.  -VO           Comprehension Assessment/Intervention    Comprehension Assessment/Intervention  Auditory Comprehension  -VO           Auditory Comprehension Assessment/Intervention    Auditory Comprehension (Communication)  WFL  -VO        Able to Identify Objects/Pictures (Communication)  WFL  -VO        Answers Questions (Communication)  WFL  -VO        Able to Follow Commands (Communication)  WFL  -VO        Narrative Discourse  WFL  -VO           Expression Assessment/Intervention    Expression Assessment/Intervention  verbal expression  -VO           Verbal Expression Assessment/Intervention    Verbal Expression  WFL  -VO        Automatic Speech (Communication)  WFL  -VO        Repetition  WFL  -VO        Phrase Completion  WFL  -VO        Responsive Naming  WFL  -VO        Confrontational Naming  WFL  -VO        Spontaneous/Functional Words  WFL  -VO        Sentence Formulation  WFL  -VO        Conversational Discourse/Fluency  WFL  -VO           Oral Musculature and Cranial Nerve Assessment    Oral Motor General Assessment  oral labial or buccal impairment;lingual impairment;mandibular impairment  -VO        Mandibular Impairment Detail, Cranial Nerve V (Trigeminal)  reduced facial sensation on  left  -VO        Oral Labial or Buccal Impairment, Detail, Cranial Nerve VII (Facial):  left labial droop;reduced ROM  -VO        Lingual Impairment, Detail. Cranial Nerves IX, XII (Glossopharyngeal and Hypoglossal)  reduced strength;reduced strength left  -VO           Motor Speech Assessment/Intervention    Motor Speech Function  mild impairment  -VO        Characteristics Consistent with Dysarthria  slow rate;slurred speech;decreased articulation  -VO           Cognitive Assessment Intervention- SLP    Cognitive Function (Cognition)  mild impairment  -VO        Orientation Status (Cognition)  awareness of basic personal information;person;place;time;situation;WFL  -VO        Memory (Cognitive)  functional;simple;WFL;unable/difficult to assess;other (see comments) cont to assess in tx  -VO        Thought Organization (Cognitive)  unable/difficult to assess;other (see comments) cont to assess in tx  -VO        Reasoning (Cognitive)  simple;WFL;unable/difficult to assess;other (see comments) cont to assess in tx  -VO        Pragmatics (Communication)  eye contact;mild impairment  -VO        Right Hemisphere Function  left neglect;mild impairment  -VO        Cognition, Comment  Pt began to experience shoulder pain during eval that impacted ability to fully evaluate cognition. Will add goals for deficits observed and cont to assess further in tx.  -VO           SLP Clinical Impressions    SLP Diagnosis  Pt w/ mild flaccid dysarthria and mild cognitive impairsment. Will cont to further assess in tx.  -VO        Rehab Potential/Prognosis  good  -VO        SLC Criteria for Skilled Therapy Interventions Met  yes  -VO        Functional Impact  functional impact in ADLs  -VO           Recommendations    Therapy Frequency (SLP SLC)  5 days per week  -VO        Predicted Duration Therapy Intervention (Days)  until discharge  -VO        Anticipated Dischage Disposition (SLP)  inpatient rehabilitation facility;anticipate  therapy at next level of care  -VO          User Key  (r) = Recorded By, (t) = Taken By, (c) = Cosigned By    Initials Name Effective Dates    VO Jaye Pastor MA,CCC-SLP 10/24/18 -              EDUCATION  The patient has been educated in the following areas:     Cognitive Impairment Communication Impairment.    SLP Recommendation and Plan  SLP Diagnosis: Pt w/ mild flaccid dysarthria and mild cognitive impairsment. Will cont to further assess in tx.     Swallow Criteria for Skilled Therapeutic Interventions Met: demonstrates skilled criteria  SLC Criteria for Skilled Therapy Interventions Met: yes  Anticipated Dischage Disposition (SLP): inpatient rehabilitation facility, anticipate therapy at next level of care  Therapy Frequency (Swallow): PRN  Predicted Duration Therapy Intervention (Days): until discharge    Plan of Care Reviewed With: patient      SLP GOALS     Row Name 06/08/20 0945 06/08/20 0915          Oral Nutrition/Hydration Goal 1 (SLP)    Oral Nutrition/Hydration Goal 1, SLP  --  LTG: Pt will return to regular diet, thin liquids w/o overt s/sxs aspiration or difficulty.  -VO     Time Frame (Oral Nutrition/Hydration Goal 1, SLP)  --  by discharge  -VO     Progress/Outcomes (Oral Nutrition/Hydration Goal 1, SLP)  --  goal ongoing  -VO        Oral Nutrition/Hydration Goal 2 (SLP)    Oral Nutrition/Hydration Goal 2, SLP  --  Pt will demonstrate adequate mastication of regular solid w/o overt s/sxs aspiration or difficulty w/ 100% acc.  -VO     Time Frame (Oral Nutrition/Hydration Goal 2, SLP)  --  short term goal (STG)  -VO     Progress/Outcomes (Oral Nutrition/Hydration Goal 2, SLP)  --  goal ongoing  -VO        Oral Nutrition/Hydration Goal (SLP)    Oral Nutrition/Hydration Goal, SLP  --  Pt will tolerate soft/whole diet w/ thins w/o s/sxs aspiration.  -VO     Time Frame (Oral Nutrition/Hydration Goal, SLP)  --  short term goal (STG)  -VO     Progress/Outcomes (Oral Nutrition/Hydration Goal, SLP)   --  goal ongoing  -VO        Labial Strengthening Goal 1 (SLP)    Activity (Labial Strengthening Goal 1, SLP)  --  increase labial tone  -VO     Increase Labial Tone  --  swallow trials;labial resistance exercises  -VO     [place]/Accuracy (Labial Strengthening Goal 1, SLP)  --  with minimal cues (75-90% accuracy)  -VO     Time Frame (Labial Strengthening Goal 1, SLP)  --  short term goal (STG)  -VO     Progress/Outcomes (Labial Strengthening Goal 1, SLP)  --  goal ongoing  -VO        Lingual Strengthening Goal 1 (SLP)    Activity (Lingual Strengthening Goal 1, SLP)  --  increase lingual tone/sensation/control/coordination/movement  -VO     Increase Lingual Tone/Sensation/Control/Coordination/Movement  --  swallow trials;lingual resistance exercises;lingual movement exercises  -VO     [place]/Accuracy (Lingual Strengthening Goal 1, SLP)  --  with minimal cues (75-90% accuracy)  -VO     Time Frame (Lingual Strengthening Goal 1, SLP)  --  short term goal (STG)  -VO     Progress/Outcomes (Lingual Strengthening Goal 1, SLP)  --  goal ongoing  -VO        Articulation Goal 1 (SLP)    Improve Articulation Goal 1 (SLP)  by over-articulating in connected speech;by over-articulating at phrase level;90%;with minimal cues (75-90%)  -VO  --     Time Frame (Articulation Goal 1, SLP)  short term goal (STG)  -VO  --     Progress/Outcomes (Articulation Goal 1, SLP)  goal ongoing  -VO  --        Pragmatic Skills Goal 1 (SLP)    Improve Pragmatic Skills Goal 1 (SLP)  maintain eye contact;90%;with minimal cues (75-90%)  -VO  --     Time Frame (Pragmatic Skills Goal 1, SLP)  short term goal (STG)  -VO  --     Progress/Outcomes (Pragmatic Skills Goal 1, SLP)  goal ongoing  -VO  --        Right Hemisphere Function Goal 1 (SLP)    Improve Right Hemisphere Function Through Goal 1 (SLP)  demonstrate awareness of communication partner in left visual field;identify physical/cognitive strengths and limitations;use compensatory  strategies for left neglect;complete visuo-perceptual activities (L/R discrimination, spatial concepts);90%;with minimal cues (75-90%)  -VO  --     Time Frame (Right Hemisphere Function Goal 1, SLP)  short term goal (STG)  -VO  --     Progress/Outcomes (Right Hemisphere Function Goal 1, SLP)  goal ongoing  -VO  --        Additional Goal 1 (SLP)    Additional Goal 1, SLP  Pt will improve cognitive-communication skills in order to allow optimal participation in care and ADLs  -VO  --     Time Frame (Additional Goal 1, SLP)  by discharge  -VO  --     Progress/Outcomes (Additional Goal 1, SLP)  goal ongoing  -VO  --       User Key  (r) = Recorded By, (t) = Taken By, (c) = Cosigned By    Initials Name Provider Type    Jaye Troncoso MA,CCC-SLP Speech and Language Pathologist                  Time Calculation:     Time Calculation- SLP     Row Name 20 1017             Time Calculation- SLP    SLP Start Time  0915  -VO      SLP Received On  20  -VO        User Key  (r) = Recorded By, (t) = Taken By, (c) = Cosigned By    Initials Name Provider Type    Jaye Troncoso MA,CCC-SLP Speech and Language Pathologist          Therapy Charges for Today     Code Description Service Date Service Provider Modifiers Qty    84423525159 HC ST EVAL ORAL PHARYNG SWALLOW 3 2020 Jaye Pastor MA,CCC-SLP GN 1    [id number] HC ST EVAL SPEECH AND PROD W LANG  3 2020 Jaye Pastor MA,CCC-SLP GN 1                     Jaye Pastor MA, CCC-SLP  2020 and Acute Care - Speech Language Pathology   Swallow Initial Evaluation [facility]     Patient Name: Pernell Adkins  : 1965  MRN: [id number]  Today's Date: 2020               Admit Date: 2020    Visit Dx:     ICD-10-CM ICD-9-CM   1. Left-sided weakness R53.1 728.87   2. Dysarthria R47.1 784.51   3. Acute ischemic stroke (CMS/HCC) I63.9 434.91   4. Hypokalemia E87.6 276.8   5. Chest pain, unspecified type R07.9 786.50   6.  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     SWALLOW EVALUATION (last 72 hours)      SLP Adult Swallow Evaluation     Row Name 06/08/20 0915                   Rehab Evaluation    Document Type  evaluation  -VO        Subjective Information  complains of;pain shoulder  -VO        Patient Observations  alert;cooperative  -VO        Patient/Family Observations  Pt began to experience reported 10/10 shoulder pain during exam. RN notified.  -VO        Patient Effort  good  -VO           General Information    Patient Profile Reviewed  yes  -VO        Pertinent History Of Current Problem  adm CVA w/u w/ L wkns. CTH negative, no MRI yet. Hx COPD.  -VO        Current Method of Nutrition  NPO  -VO        Precautions/Limitations, Vision  vision impairment, bilaterally;other (see comments) reported blurry vision in both eyes  -VO        Precautions/Limitations, Hearing  WFL;for purposes of eval  -VO        Prior Level of Function-Communication  WFL  -VO        Prior Level of Function-Swallowing  no diet consistency restrictions  -VO        Plans/Goals Discussed with  patient;agreed upon  -VO        Barriers to Rehab  none identified  -VO        Patient's Goals for Discharge  patient did not state  -VO           Pain Assessment    Additional Documentation   Pain Scale: Numbers Pre/Post-Treatment (Group)  -VO           Pain Scale: Numbers Pre/Post-Treatment    Pain Scale: Numbers, Pretreatment  0/10 - no pain  -VO        Pain Scale: Numbers, Post-Treatment  10/10  -VO        Pain Location - Side  Left  -VO        Pain Location  shoulder  -VO        Pre/Post Treatment Pain Comment  repositioned and notified RN.  -VO           Oral Motor and Function    Dentition Assessment  upper dentures/partial in place;lower dentures/partial in place  -VO        Secretion Management  WNL/WFL  -VO        Mucosal Quality  moist, healthy  -VO        Volitional Swallow  WFL  -VO        Volitional Cough  WFL  -VO           Oral Musculature and Cranial Nerve Assessment    Oral Motor General Assessment  oral labial or buccal impairment;lingual impairment;mandibular impairment  -VO        Mandibular Impairment Detail, Cranial Nerve V (Trigeminal)  reduced facial sensation on left  -VO        Oral Labial or Buccal Impairment, Detail, Cranial Nerve VII (Facial):  left labial droop;reduced ROM  -VO        Lingual Impairment, Detail. Cranial Nerves IX, XII (Glossopharyngeal and Hypoglossal)  reduced strength;reduced strength left  -VO           General Eating/Swallowing Observations    Respiratory Support Currently in Use  room air  -VO        Eating/Swallowing Skills  self-fed;fed by SLP  -VO        Positioning During Eating  upright in bed  -VO        Utensils Used  spoon;cup;straw  -VO        Consistencies Trialed  thin liquids;pureed;regular textures  -VO           Respiratory    Respiratory Status  WFL  -VO           Clinical Swallow Eval    Oral Prep Phase  impaired  -VO        Oral Transit  WFL  -VO        Oral Residue  WFL  -VO        Pharyngeal Phase  no overt signs/symptoms of pharyngeal impairment  -VO        Clinical Swallow Evaluation Summary  CSE completed this AM: Towards end of evaluation, pt began to c/o significant L shoulder pain w/ a 10/10 pain score. Notified RN and  repostioned, however unable to fully push pt w/ thin liquids. L labial/lingual/mandibular wkns noted. Accepted thins via cup/str, puree, and cracker w/o overt s/sxs aspiration. Incr'd prep and decreased bolus control/manipulation w/ regular solid. Will initiate soft/whole diet w/ thin liquids and f/u for diet tolerance.  -VO           Oral Prep Concerns    Oral Prep Concerns  increased prep time  -VO        Increased Prep Time  regular consistencies  -VO           Clinical Impression    SLP Swallowing Diagnosis  mild;oral dysfunction;functional pharyngeal phase  -VO        Functional Impact  risk of aspiration/pneumonia  -VO        Rehab Potential/Prognosis, Swallowing  good, to achieve stated therapy goals  -VO        Swallow Criteria for Skilled Therapeutic Interventions Met  demonstrates skilled criteria  -VO           Recommendations    Therapy Frequency (Swallow)  PRN  -VO        Predicted Duration Therapy Intervention (Days)  1 week  -VO        SLP Diet Recommendation  soft textures;whole;thin liquids  -VO        Recommended Precautions and Strategies  small bites of food and sips of liquid;upright posture during/after eating;check mouth frequently for oral residue/pocketing  -VO        SLP Rec. for Method of Medication Administration  meds whole;with thin liquids;as tolerated  -VO        Monitor for Signs of Aspiration  yes;notify SLP if any concerns  -VO        Anticipated Dischage Disposition (SLP)  inpatient rehabilitation facility;anticipate therapy at next level of care  -VO          User Key  (r) = Recorded By, (t) = Taken By, (c) = Cosigned By    Initials Name Effective Dates    VO Jaye Pastor MA,CCC-SLP 10/24/18 -           EDUCATION  The patient has been educated in the following areas:   Dysphagia (Swallowing Impairment) Oral Care/Hydration Modified Diet Instruction.    SLP Recommendation and Plan  SLP Swallowing Diagnosis: mild, oral dysfunction, functional pharyngeal phase  SLP Diet  Recommendation: soft textures, whole, thin liquids  Recommended Precautions and Strategies: small bites of food and sips of liquid, upright posture during/after eating, check mouth frequently for oral residue/pocketing  SLP Rec. for Method of Medication Administration: meds whole, with thin liquids, as tolerated     Monitor for Signs of Aspiration: yes, notify SLP if any concerns     Swallow Criteria for Skilled Therapeutic Interventions Met: demonstrates skilled criteria  Anticipated Dischage Disposition (SLP): inpatient rehabilitation facility, anticipate therapy at next level of care  Rehab Potential/Prognosis, Swallowing: good, to achieve stated therapy goals  Therapy Frequency (Swallow): PRN  Predicted Duration Therapy Intervention (Days): until discharge       Plan of Care Reviewed With: patient    SLP GOALS     Row Name 06/08/20 0945 06/08/20 0915          Oral Nutrition/Hydration Goal 1 (SLP)    Oral Nutrition/Hydration Goal 1, SLP  --  LTG: Pt will return to regular diet, thin liquids w/o overt s/sxs aspiration or difficulty.  -VO     Time Frame (Oral Nutrition/Hydration Goal 1, SLP)  --  by discharge  -VO     Progress/Outcomes (Oral Nutrition/Hydration Goal 1, SLP)  --  goal ongoing  -VO        Oral Nutrition/Hydration Goal 2 (SLP)    Oral Nutrition/Hydration Goal 2, SLP  --  Pt will demonstrate adequate mastication of regular solid w/o overt s/sxs aspiration or difficulty w/ 100% acc.  -VO     Time Frame (Oral Nutrition/Hydration Goal 2, SLP)  --  short term goal (STG)  -VO     Progress/Outcomes (Oral Nutrition/Hydration Goal 2, SLP)  --  goal ongoing  -VO        Oral Nutrition/Hydration Goal (SLP)    Oral Nutrition/Hydration Goal, SLP  --  Pt will tolerate soft/whole diet w/ thins w/o s/sxs aspiration.  -VO     Time Frame (Oral Nutrition/Hydration Goal, SLP)  --  short term goal (STG)  -VO     Progress/Outcomes (Oral Nutrition/Hydration Goal, SLP)  --  goal ongoing  -VO        Labial Strengthening Goal 1  (SLP)    Activity (Labial Strengthening Goal 1, SLP)  --  increase labial tone  -VO     Increase Labial Tone  --  swallow trials;labial resistance exercises  -VO     [place]/Accuracy (Labial Strengthening Goal 1, SLP)  --  with minimal cues (75-90% accuracy)  -VO     Time Frame (Labial Strengthening Goal 1, SLP)  --  short term goal (STG)  -VO     Progress/Outcomes (Labial Strengthening Goal 1, SLP)  --  goal ongoing  -VO        Lingual Strengthening Goal 1 (SLP)    Activity (Lingual Strengthening Goal 1, SLP)  --  increase lingual tone/sensation/control/coordination/movement  -VO     Increase Lingual Tone/Sensation/Control/Coordination/Movement  --  swallow trials;lingual resistance exercises;lingual movement exercises  -VO     [place]/Accuracy (Lingual Strengthening Goal 1, SLP)  --  with minimal cues (75-90% accuracy)  -VO     Time Frame (Lingual Strengthening Goal 1, SLP)  --  short term goal (STG)  -VO     Progress/Outcomes (Lingual Strengthening Goal 1, SLP)  --  goal ongoing  -VO        Articulation Goal 1 (SLP)    Improve Articulation Goal 1 (SLP)  by over-articulating in connected speech;by over-articulating at phrase level;90%;with minimal cues (75-90%)  -VO  --     Time Frame (Articulation Goal 1, SLP)  short term goal (STG)  -VO  --     Progress/Outcomes (Articulation Goal 1, SLP)  goal ongoing  -VO  --        Pragmatic Skills Goal 1 (SLP)    Improve Pragmatic Skills Goal 1 (SLP)  maintain eye contact;90%;with minimal cues (75-90%)  -VO  --     Time Frame (Pragmatic Skills Goal 1, SLP)  short term goal (STG)  -VO  --     Progress/Outcomes (Pragmatic Skills Goal 1, SLP)  goal ongoing  -VO  --        Right Hemisphere Function Goal 1 (SLP)    Improve Right Hemisphere Function Through Goal 1 (SLP)  demonstrate awareness of communication partner in left visual field;identify physical/cognitive strengths and limitations;use compensatory strategies for left neglect;complete visuo-perceptual activities  (L/R discrimination, spatial concepts);90%;with minimal cues (75-90%)  -VO  --     Time Frame (Right Hemisphere Function Goal 1, SLP)  short term goal (STG)  -VO  --     Progress/Outcomes (Right Hemisphere Function Goal 1, SLP)  goal ongoing  -VO  --        Additional Goal 1 (SLP)    Additional Goal 1, SLP  Pt will improve cognitive-communication skills in order to allow optimal participation in care and ADLs  -VO  --     Time Frame (Additional Goal 1, SLP)  by discharge  -VO  --     Progress/Outcomes (Additional Goal 1, SLP)  goal ongoing  -VO  --       User Key  (r) = Recorded By, (t) = Taken By, (c) = Cosigned By    Initials Name Provider Type    Jaye Troncoso MA,CCC-SLP Speech and Language Pathologist             Time Calculation:   Time Calculation- SLP     Row Name 06/08/20 1017             Time Calculation- SLP    SLP Start Time  0915  -VO      SLP Received On  06/08/20  -VO        User Key  (r) = Recorded By, (t) = Taken By, (c) = Cosigned By    Initials Name Provider Type    Jaye Troncoso MA,CCC-SLP Speech and Language Pathologist          Therapy Charges for Today     Code Description Service Date Service Provider Modifiers Qty    24682930371 HC ST EVAL ORAL PHARYNG SWALLOW 3 6/8/2020 Jaye Pastor MA,CCC-SLP GN 1    [id number] HC ST EVAL SPEECH AND PROD W LANG  3 6/8/2020 Jaye Pastor MA,CCC-SLP GN 1               Jaye Pastor MA, CCC-SLP  6/8/2020

## 2020-06-09 ENCOUNTER — APPOINTMENT (OUTPATIENT)
Dept: MRI IMAGING | Facility: HOSPITAL | Age: 55
End: 2020-06-09

## 2020-06-09 LAB
ANION GAP SERPL CALCULATED.3IONS-SCNC: 13 MMOL/L (ref 5–15)
BASOPHILS # BLD AUTO: 0 10*3/MM3 (ref 0–0.2)
BASOPHILS NFR BLD AUTO: 0 % (ref 0–1.5)
BUN BLD-MCNC: 7 MG/DL (ref 6–20)
BUN/CREAT SERPL: 8.6 (ref 7–25)
CALCIUM SPEC-SCNC: 9.4 MG/DL (ref 8.6–10.5)
CHLORIDE SERPL-SCNC: 102 MMOL/L (ref 98–107)
CO2 SERPL-SCNC: 24 MMOL/L (ref 22–29)
CREAT BLD-MCNC: 0.81 MG/DL (ref 0.76–1.27)
DEPRECATED RDW RBC AUTO: 47.3 FL (ref 37–54)
EOSINOPHIL # BLD AUTO: 0 10*3/MM3 (ref 0–0.4)
EOSINOPHIL NFR BLD AUTO: 0 % (ref 0.3–6.2)
ERYTHROCYTE [DISTWIDTH] IN BLOOD BY AUTOMATED COUNT: 14.6 % (ref 12.3–15.4)
GFR SERPL CREATININE-BSD FRML MDRD: 99 ML/MIN/1.73
GLUCOSE BLD-MCNC: 93 MG/DL (ref 65–99)
HCT VFR BLD AUTO: 41.7 % (ref 37.5–51)
HGB BLD-MCNC: 13.5 G/DL (ref 13–17.7)
IMM GRANULOCYTES # BLD AUTO: 0 10*3/MM3 (ref 0–0.05)
IMM GRANULOCYTES NFR BLD AUTO: 0 % (ref 0–0.5)
LYMPHOCYTES # BLD AUTO: 0.98 10*3/MM3 (ref 0.7–3.1)
LYMPHOCYTES NFR BLD AUTO: 27 % (ref 19.6–45.3)
MCH RBC QN AUTO: 28.6 PG (ref 26.6–33)
MCHC RBC AUTO-ENTMCNC: 32.4 G/DL (ref 31.5–35.7)
MCV RBC AUTO: 88.3 FL (ref 79–97)
MONOCYTES # BLD AUTO: 0.35 10*3/MM3 (ref 0.1–0.9)
MONOCYTES NFR BLD AUTO: 9.6 % (ref 5–12)
NEUTROPHILS # BLD AUTO: 2.3 10*3/MM3 (ref 1.7–7)
NEUTROPHILS NFR BLD AUTO: 63.4 % (ref 42.7–76)
NRBC BLD AUTO-RTO: 0 /100 WBC (ref 0–0.2)
PLATELET # BLD AUTO: 197 10*3/MM3 (ref 140–450)
PMV BLD AUTO: 9.9 FL (ref 6–12)
POTASSIUM BLD-SCNC: 3.6 MMOL/L (ref 3.5–5.2)
RBC # BLD AUTO: 4.72 10*6/MM3 (ref 4.14–5.8)
SODIUM BLD-SCNC: 139 MMOL/L (ref 136–145)
WBC NRBC COR # BLD: 3.63 10*3/MM3 (ref 3.4–10.8)

## 2020-06-09 PROCEDURE — 97530 THERAPEUTIC ACTIVITIES: CPT

## 2020-06-09 PROCEDURE — 85025 COMPLETE CBC W/AUTO DIFF WBC: CPT | Performed by: INTERNAL MEDICINE

## 2020-06-09 PROCEDURE — 80048 BASIC METABOLIC PNL TOTAL CA: CPT | Performed by: INTERNAL MEDICINE

## 2020-06-09 PROCEDURE — 94799 UNLISTED PULMONARY SVC/PX: CPT

## 2020-06-09 PROCEDURE — 99232 SBSQ HOSP IP/OBS MODERATE 35: CPT | Performed by: NURSE PRACTITIONER

## 2020-06-09 PROCEDURE — 99232 SBSQ HOSP IP/OBS MODERATE 35: CPT | Performed by: INTERNAL MEDICINE

## 2020-06-09 PROCEDURE — 92526 ORAL FUNCTION THERAPY: CPT

## 2020-06-09 PROCEDURE — 70551 MRI BRAIN STEM W/O DYE: CPT

## 2020-06-09 PROCEDURE — 92507 TX SP LANG VOICE COMM INDIV: CPT

## 2020-06-09 RX ADMIN — IPRATROPIUM BROMIDE AND ALBUTEROL SULFATE 3 ML: 2.5; .5 SOLUTION RESPIRATORY (INHALATION) at 07:52

## 2020-06-09 RX ADMIN — BUDESONIDE AND FORMOTEROL FUMARATE DIHYDRATE 2 PUFF: 160; 4.5 AEROSOL RESPIRATORY (INHALATION) at 07:52

## 2020-06-09 RX ADMIN — GABAPENTIN 400 MG: 400 CAPSULE ORAL at 15:31

## 2020-06-09 RX ADMIN — POTASSIUM CHLORIDE 40 MEQ: 10 CAPSULE, COATED, EXTENDED RELEASE ORAL at 18:29

## 2020-06-09 RX ADMIN — SODIUM CHLORIDE, PRESERVATIVE FREE 10 ML: 5 INJECTION INTRAVENOUS at 08:42

## 2020-06-09 RX ADMIN — HYDROCODONE BITARTRATE AND ACETAMINOPHEN 1 TABLET: 7.5; 325 TABLET ORAL at 14:59

## 2020-06-09 RX ADMIN — SERTRALINE HYDROCHLORIDE 50 MG: 50 TABLET ORAL at 08:43

## 2020-06-09 RX ADMIN — METOPROLOL TARTRATE 50 MG: 50 TABLET, FILM COATED ORAL at 20:07

## 2020-06-09 RX ADMIN — SODIUM CHLORIDE, PRESERVATIVE FREE 10 ML: 5 INJECTION INTRAVENOUS at 20:08

## 2020-06-09 RX ADMIN — POTASSIUM CHLORIDE 40 MEQ: 10 CAPSULE, COATED, EXTENDED RELEASE ORAL at 15:06

## 2020-06-09 RX ADMIN — HYDROCODONE BITARTRATE AND ACETAMINOPHEN 1 TABLET: 7.5; 325 TABLET ORAL at 08:52

## 2020-06-09 RX ADMIN — ASPIRIN 325 MG ORAL TABLET 325 MG: 325 PILL ORAL at 10:19

## 2020-06-09 RX ADMIN — ALPRAZOLAM 0.25 MG: 0.25 TABLET ORAL at 20:04

## 2020-06-09 RX ADMIN — GABAPENTIN 400 MG: 400 CAPSULE ORAL at 20:08

## 2020-06-09 RX ADMIN — IPRATROPIUM BROMIDE AND ALBUTEROL SULFATE 3 ML: 2.5; .5 SOLUTION RESPIRATORY (INHALATION) at 20:13

## 2020-06-09 RX ADMIN — ATORVASTATIN CALCIUM 80 MG: 40 TABLET, FILM COATED ORAL at 20:07

## 2020-06-09 RX ADMIN — GABAPENTIN 400 MG: 400 CAPSULE ORAL at 08:42

## 2020-06-09 RX ADMIN — BUDESONIDE AND FORMOTEROL FUMARATE DIHYDRATE 2 PUFF: 160; 4.5 AEROSOL RESPIRATORY (INHALATION) at 20:14

## 2020-06-09 RX ADMIN — HYDROCODONE BITARTRATE AND ACETAMINOPHEN 1 TABLET: 7.5; 325 TABLET ORAL at 20:07

## 2020-06-09 RX ADMIN — HYDROCODONE BITARTRATE AND ACETAMINOPHEN 1 TABLET: 7.5; 325 TABLET ORAL at 02:28

## 2020-06-09 RX ADMIN — MONTELUKAST SODIUM 10 MG: 10 TABLET, COATED ORAL at 20:07

## 2020-06-09 RX ADMIN — TAMSULOSIN HYDROCHLORIDE 0.4 MG: 0.4 CAPSULE ORAL at 08:43

## 2020-06-09 RX ADMIN — IPRATROPIUM BROMIDE AND ALBUTEROL SULFATE 3 ML: 2.5; .5 SOLUTION RESPIRATORY (INHALATION) at 12:32

## 2020-06-09 NOTE — PLAN OF CARE
Problem: Patient Care Overview  Goal: Plan of Care Review  Flowsheets  Taken 6/9/2020 0721  Progress: no change  Outcome Summary: NIH has fluctuated from a 7-13. At around 2200, he began to complain of sharp stabbing chest pain. EKG obtained with no changes. APRN notified and STAT Trop ordered. Stroke navigator, Katherine Arguelles, made aware of neuro changes. See note. Chest pain and neuro symptoms improved after PRN pain medication was given. VSS. AM MRI obtained.  Taken 6/8/2020 2000  Plan of Care Reviewed With: patient

## 2020-06-09 NOTE — PROGRESS NOTES
"Clinical Nutrition Note      Patient Name: Pernell Adkins  MRN: [id number]  Admission date: 6/8/2020      Multidisciplinary Rounds    Additional information obtained during MDR:  RN reports pt had CP and neurological changes overnight, Stroke navigator notified, EKG and troponin negative, MRI ordered. He is eating well this morning, c/o back pain, no further CP noted.    Current diet: Diet Soft Texture; Whole Foods; Thin; Cardiac    Oral Nutrition Supplement:     Pertinent medical data reviewed  No nutrition risk identified on nursing screen; MST score \"0\"    Intervention:  Menu provided, advised of alternate selections  Plan of care and goals reviewed    Monitor:  RD to follow per protocol      Keiry Parikh MS,RD,LD  06/09/20 10:51 AM  Time: 15  mins       "

## 2020-06-09 NOTE — PROGRESS NOTES
Stroke Progress Note       Chief Complaint: Left-sided weakness/heaviness and left shoulder pain    Subjective     Subjective:  This is a 54-year-old right-handed white male with known diagnosis of hypertension, COPD who has been admitted with left-sided weakness and got IV TPA for the same.  Patient continues to have left-sided weakness, and is complaining of left shoulder pain.  Patient feels very heavy to lift up the left upper and lower extremity.  On exam patient has inconsistent left upper and lower extremity weakness, 4/5, with poor effort and easy distractibility.    Inconsistent facial droop.  Trouble history: Overnight patient complained of chest pain and left shoulder pain.    Review of Systems   Musculoskeletal: Positive for back pain.        Left shoulder pain   Neurological: Positive for facial asymmetry and weakness.        Inconsistent presentation of weakness and facial  asymmetry   All other systems reviewed and are negative.       Objective      Temp:  [96.8 °F (36 °C)-98.1 °F (36.7 °C)] 96.8 °F (36 °C)  Heart Rate:  [] 77  Resp:  [15-20] 16  BP: ()/() 99/67    Neurological Exam  Mental Status  Alert. Oriented to person, place, time and situation. Speech is normal. Language is fluent with no aphasia.    Cranial Nerves  CN II: Visual acuity is normal. Visual fields full to confrontation.  CN III, IV, VI: Extraocular movements intact bilaterally. Normal lids and orbits bilaterally. Pupils equal round and reactive to light bilaterally.  CN V: Facial sensation is normal.  CN VII: Full and symmetric facial movement.  CN VIII: Hearing is normal.  CN IX, X: Palate elevates symmetrically. Normal gag reflex.  CN XI: Shoulder shrug strength is normal.  CN XII: Tongue midline without atrophy or fasciculations.    Motor  Normal muscle bulk throughout. No fasciculations present. Normal muscle tone. No abnormal involuntary movements.  Inconsistent left upper and lower extremity weakness  improving with distraction.    Sensory  Light touch is normal in upper and lower extremities.     Coordination  Right: Finger-to-nose normal.  Left: Finger-to-nose normal.    Gait  Not assessed.      Physical Exam   Constitutional: He is oriented to person, place, and time. He appears well-developed and well-nourished.   HENT:   Head: Normocephalic and atraumatic.   Eyes: Pupils are equal, round, and reactive to light. Lids are normal.   Neck: Neck supple.   Pulmonary/Chest: Effort normal.   Musculoskeletal:   Range of motion limited by left shoulder pain   Neurological: He is alert and oriented to person, place, and time.   Skin: Skin is warm and dry.   Psychiatric: His speech is normal.   Patient appears tremulous and anxious       Results Review:    I reviewed the patient's new clinical results.    Lab Results (last 24 hours)     Procedure Component Value Units Date/Time    Basic Metabolic Panel [[id number]]  (Normal) Collected:  06/09/20 0622    Specimen:  Blood Updated:  06/09/20 0730     Glucose 93 mg/dL      BUN 7 mg/dL      Creatinine 0.81 mg/dL      Sodium 139 mmol/L      Potassium 3.6 mmol/L      Chloride 102 mmol/L      CO2 24.0 mmol/L      Calcium 9.4 mg/dL      eGFR Non African Amer 99 mL/min/1.73      BUN/Creatinine Ratio 8.6     Anion Gap 13.0 mmol/L     Narrative:       GFR Normal >60  Chronic Kidney Disease <60  Kidney Failure <15      CBC & Differential [[id number]] Collected:  06/09/20 0622    Specimen:  Blood Updated:  06/09/20 0711    Narrative:       The following orders were created for panel order CBC & Differential.  Procedure                               Abnormality         Status                     ---------                               -----------         ------                     CBC Auto Differential[[id number]]        Abnormal            Final result                 Please view results for these tests on the individual orders.    CBC Auto Differential [[id number]]  (Abnormal)  Collected:  06/09/20 0622    Specimen:  Blood Updated:  06/09/20 0711     WBC 3.63 10*3/mm3      RBC 4.72 10*6/mm3      Hemoglobin 13.5 g/dL      Hematocrit 41.7 %      MCV 88.3 fL      MCH 28.6 pg      MCHC 32.4 g/dL      RDW 14.6 %      RDW-SD 47.3 fl      MPV 9.9 fL      Platelets 197 10*3/mm3      Neutrophil % 63.4 %      Lymphocyte % 27.0 %      Monocyte % 9.6 %      Eosinophil % 0.0 %      Basophil % 0.0 %      Immature Grans % 0.0 %      Neutrophils, Absolute 2.30 10*3/mm3      Lymphocytes, Absolute 0.98 10*3/mm3      Monocytes, Absolute 0.35 10*3/mm3      Eosinophils, Absolute 0.00 10*3/mm3      Basophils, Absolute 0.00 10*3/mm3      Immature Grans, Absolute 0.00 10*3/mm3      nRBC 0.0 /100 WBC     Troponin [[id number]]  (Normal) Collected:  06/08/20 2218    Specimen:  Blood Updated:  06/08/20 2305     Troponin T <0.010 ng/mL     Narrative:       Troponin T Reference Range:  <= 0.03 ng/mL-   Negative for AMI  >0.03 ng/mL-     Abnormal for myocardial necrosis.  Clinicians would have to utilize clinical acumen, EKG, Troponin and serial changes to determine if it is an Acute Myocardial Infarction or myocardial injury due to an underlying chronic condition.       Results may be falsely decreased if patient taking Biotin.      Potassium [[id number]]  (Normal) Collected:  06/08/20 1649    Specimen:  Blood Updated:  06/08/20 1713     Potassium 4.2 mmol/L         Ct Angiogram Head    Result Date: 6/8/2020  Negative CT angiogram of the head and neck. No intracranial or extracranial arterial flow limiting stenosis or aneurysm. Widely patent cervical carotid bifurcations bilaterally. Signer Name: Charlie Hardy MD  Signed: 6/8/2020 4:33 AM  Workstation [facility]: [facility]    Ct Head Without Contrast    Result Date: 6/8/2020  Senescent changes without acute abnormality. Signer Name: Phu Shin MD  Signed: 6/8/2020 6:10 AM  Workstation Name: [facility]  [facility]   [place]    Ct Angiogram Neck    Result Date: 6/8/2020  Negative CT angiogram of the head and neck. No intracranial or extracranial arterial flow limiting stenosis or aneurysm. Widely patent cervical carotid bifurcations bilaterally. Signer Name: Charlie Hardy MD  Signed: 6/8/2020 4:33 AM  Workstation [facility]: [facility]    Mri Brain Without Contrast    Result Date: 6/9/2020  1.  No acute infarction.  2. Trace, if any, chronic small vessel ischemic disease in the supratentorial white matter.  3.  Near total opacification of the entirety of the paranasal sinuses with air-fluid levels and opacifications of fluid and edema as detailed above throughout the sinuses concerning for sinusitis.   D:  06/09/2020 E:  06/09/2020       Ct Angiogram Chest    Result Date: 6/8/2020  1.  No evidence of pulmonary embolism or other acute vascular abnormality within the chest. 2.  Mild dependent posterior lung base atelectasis. Lungs otherwise clear. 3.  Noncalcified 6 mm peripheral right lower lobe pulmonary nodule. See below. Lung nodule management recommendation: Current published guidelines recommend initial follow-up CT in 6-12 months, and again in 18-24 months for uncomplicated pulmonary nodules measuring 6 to 8 mm in low risk patients (Fleischner Society guidelines, 2017). Signer Name: Charlie Hardy MD  Signed: 6/8/2020 4:59 AM  Workstation [facility]: [facility]    Ct Head Without Contrast Stroke Protocol    Result Date: 6/8/2020  1.  No acute intracranial abnormality. 2.  Extensive bilateral paranasal sinus costal disease. NOTIFICATION: Critical Value/emergent results were relayed from me to the ED treatment team by telephone through the CT technologist, Stoney, at 04:07 on 6/8/2020. Signer Name: Charlie Hardy MD  Signed: 6/8/2020 4:09 AM  Workstation [facility]: [facility]    Ct Cerebral Perfusion With &  Without Contrast    Result Date: 6/8/2020  Negative CT perfusion study. No evidence of cerebral ischemia or core infarction. Signer Name: Charlie Hardy MD  Signed: 6/8/2020 4:26 AM  Workstation Name: [email or web address]  [facility]    Results for orders placed during the hospital encounter of 06/08/20   Adult Transthoracic Echo Complete W/ Cont if Necessary Per Protocol (With Agitated Saline)    Narrative · Cardiac chamber sizes and wall thicknesses are normal. The calculated   left ventricular ejection fraction is 65%.  · The aortic and mitral valves are normal in structure and function.  · An agitated saline study is technically limited and insufficient for   diagnosis. If intracardiac shunting is suspected, a transcranial Doppler   study is suggested.  · There are no other important findings on this study.            Assessment/Plan     Assessment/Plan:      1.  Left-sided weakness.  Likely functional.    All imaging negative for acute ischemic stroke/acute abnormality.  2.  PT/OT can work with him.  3.  Chronic back pain.  Patient has chronic back pain for which he takes oxycodone 4 times a day.   4.  Hypertension.  Normal blood pressure goals for him.  Resume his home medications.    Okay for telemetry orders per neurology.  Hopefully home soon.  Neurology will sign off.  Do not hesitate to call with questions.    Mary Alice Velasco, TOBIAS  06/09/20  14:06

## 2020-06-09 NOTE — THERAPY TREATMENT NOTE
Acute Care - Occupational Therapy Treatment Note   [place]     Patient Name: Pernell Adkins  : 1965  MRN: [id number]  Today's Date: 2020             Admit Date: 2020       ICD-10-CM ICD-9-CM   1. Left-sided weakness R53.1 728.87   2. Dysarthria R47.1 784.51   3. Acute ischemic stroke (CMS/HCC) I63.9 434.91   4. Hypokalemia E87.6 276.8   5. Chest pain, unspecified type R07.9 786.50   6. 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 No pertinent surgical history.    Therapy Treatment    Rehabilitation Treatment Summary     Row Name 20 1116             Treatment Time/Intention    Discipline  occupational therapist  -CL      Document Type  therapy note (daily note)  -CL      Subjective Information  complains of;pain  -CL      Patient Effort  good  -CL      Existing Precautions/Restrictions  fall  -CL      Recorded by [CL] Quynh Díaz OT 20 1423      Row Name 20 1116             Vital Signs    Pre Systolic BP Rehab  102  -CL      Pre Treatment Diastolic BP  66  -CL      Post Systolic BP Rehab  126  -CL      Post Treatment Diastolic BP  85  -CL      Pretreatment Heart Rate (beats/min)  62  -CL      Posttreatment Heart Rate (beats/min)  62  -CL      Pre SpO2 (%)  95  -CL      O2 Delivery Pre Treatment  room air  -CL      Post SpO2 (%)  97   -CL      O2 Delivery Post Treatment  room air  -CL      Pre Patient Position  Supine  -CL      Intra Patient Position  Standing  -CL      Post Patient Position  Sitting  -CL      Recorded by [CL] Quynh Díaz OT 06/09/20 1423      Row Name 06/09/20 1116             Cognitive Assessment/Intervention- PT/OT    Affect/Mental Status (Cognitive)  WFL  -CL      Orientation Status (Cognition)  oriented x 3  -CL      Follows Commands (Cognition)  WFL  -CL      Cognitive Function (Cognitive)  WFL  -CL      Recorded by [CL] Quynh Díaz OT 06/09/20 1423      Row Name 06/09/20 1116             Bed Mobility Assessment/Treatment    Bed Mobility Assessment/Treatment  supine-sit  -CL      Supine-Sit [place] (Bed Mobility)  contact guard;verbal cues  -CL      Assistive Device (Bed Mobility)  bed rails;draw sheet;head of bed elevated  -CL      Recorded by [CL] Quynh Díaz OT 06/09/20 1423      Row Name 06/09/20 1116             Functional Mobility    Functional Mobility- Ind. Level  minimum assist (75% patient effort);1 person + 1 person to manage equipment;verbal cues required  -CL      Functional Mobility-Distance (Feet)  300  -CL      Recorded by [CL] Quynh Díaz OT 06/09/20 1423      Row Name 06/09/20 1116             Transfer Assessment/Treatment    Transfer Assessment/Treatment  sit-stand transfer;stand-sit transfer  -CL      Recorded by [CL] Quynh Díaz OT 06/09/20 1423      Row Name 06/09/20 1116             Sit-Stand Transfer    Sit-Stand [place] (Transfers)  minimum assist (75% patient effort);verbal cues  -CL      Recorded by [CL] Quynh Díaz OT 06/09/20 1423      Row Name 06/09/20 1116             Stand-Sit Transfer    Stand-Sit [place] (Transfers)  minimum assist (75% patient effort);verbal cues  -CL      Recorded by [CL] Quynh Díaz OT 06/09/20 1423      Row Name 06/09/20 1116             Motor Skills Assessment/Interventions    Additional Documentation  Therapeutic Exercise (Group)  -CL       Recorded by [CL] Quynh Díaz, OT 06/09/20 1423      Row Name 06/09/20 1116             Therapeutic Exercise    64987 - OT Therapeutic Activity Minutes  15  -CL      Recorded by [CL] Quynh Díaz OT 06/09/20 1423      Row Name 06/09/20 1116             Balance    Balance  static sitting balance;static standing balance  -CL      Recorded by [CL] Quynh Díaz, OT 06/09/20 1423      Row Name 06/09/20 1116             Static Sitting Balance    Level of [place] (Unsupported Sitting, Static Balance)  supervision  -CL      Sitting Position (Unsupported Sitting, Static Balance)  sitting on edge of bed  -CL      Recorded by [CL] Quynh Díaz, OT 06/09/20 1423      Row Name 06/09/20 1116             Static Standing Balance    Level of [place] (Supported Standing, Static Balance)  contact guard assist  -CL      Recorded by [CL] Quynh Díaz, OT 06/09/20 1423      Row Name 06/09/20 1116             Positioning and Restraints    Pre-Treatment Position  in bed  -CL      Post Treatment Position  chair  -CL      In Chair  notified nsg;reclined;call light within reach;encouraged to call for assist;exit alarm on;waffle cushion;legs elevated;heels elevated  -CL      Recorded by [CL] Quynh Díaz, OT 06/09/20 1423      Row Name 06/09/20 1116             Pain Scale 2: FACES Pre/Post-Treatment    Pain 2: FACES Scale, Pretreatment  2-->hurts little bit  -CL      Pain 2: FACES Scale, Post-Treatment  2-->hurts little bit  -CL      Pain Location 2 - Orientation  generalized  -CL      Pre/Post Treatment Pain 2 Comment  tolerated  -CL      Pain Intervention(s) 2  Repositioned;Ambulation/increased activity  -CL      Recorded by [CL] Quynh Díaz, OT 06/09/20 1423      Row Name 06/09/20 1116             Outcome Summary/Treatment Plan (OT)    Anticipated Discharge Disposition (OT)  home with assist;home with OP services  -CL      Recorded by [CL] Quynh Díaz, OT 06/09/20 1423        User Key  (r) = Recorded By, (t) = Taken By, (c) =  Cosigned By    Initials Name Effective Dates Discipline     Quynh Díaz OT 04/03/18 -  OT             Occupational Therapy Education                 Title: PT OT SLP Therapies (In Progress)     Topic: Occupational Therapy (In Progress)     Point: ADL training (In Progress)     Description:   Instruct learner(s) on proper safety adaptation and remediation techniques during self care or transfers.   Instruct in proper use of assistive devices.              Learning Progress Summary           Patient Acceptance, E, NR by CL at 6/9/2020 1116    Acceptance, E, NR by CL at 6/8/2020 1358                   Point: Home exercise program (In Progress)     Description:   Instruct learner(s) on appropriate technique for monitoring, assisting and/or progressing therapeutic exercises/activities.              Learning Progress Summary           Patient Acceptance, E, NR by CL at 6/9/2020 1116                   Point: Precautions (In Progress)     Description:   Instruct learner(s) on prescribed precautions during self-care and functional transfers.              Learning Progress Summary           Patient Acceptance, E, NR by CL at 6/9/2020 1116    Acceptance, E, NR by CL at 6/8/2020 1358                   Point: Body mechanics (In Progress)     Description:   Instruct learner(s) on proper positioning and spine alignment during self-care, functional mobility activities and/or exercises.              Learning Progress Summary           Patient Acceptance, E, NR by CL at 6/9/2020 1116    Acceptance, E, NR by CL at 6/8/2020 1358                               User Key     Initials Effective Dates Name Provider Type Discipline     04/03/18 -  Quynh Díaz OT Occupational Therapist OT                OT Recommendation and Plan  Outcome Summary/Treatment Plan (OT)  Anticipated Equipment Needs at Discharge (OT): (TBA further)  Anticipated Discharge Disposition (OT): home with assist, home with OP services  Therapy Frequency (OT Eval):  daily  Plan of Care Review  Plan of Care Reviewed With: patient  Plan of Care Reviewed With: patient  Outcome Summary: Pt demo significantly improved independence by ambulating 300 ft w/ Min Ax1+1. Pt conts to be limited d/t c/o pain. Recommend cont skilled IPOT POC. Recommend pt DC home w/ assist and OP rehab.  Outcome Measures     Row Name 06/09/20 1116 06/08/20 1358          How much help from another is currently needed...    Putting on and taking off regular lower body clothing?  2  -CL  2  -CL     Bathing (including washing, rinsing, and drying)  2  -CL  2  -CL     Toileting (which includes using toilet bed pan or urinal)  3  -CL  2  -CL     Putting on and taking off regular upper body clothing  3  -CL  2  -CL     Taking care of personal grooming (such as brushing teeth)  4  -CL  2  -CL     Eating meals  4  -CL  2  -CL     AM-PAC 6 Clicks Score (OT)  18  -CL  12  -CL        Modified [place] Scale    Pre-Stroke Modified [place] Scale  0 - No Symptoms at all.  -CL  0 - No Symptoms at all.  -CL     Modified [place] Scale  3 - Moderate disability.  Requiring some help, but able to walk without assistance.  -CL  4 - Moderately severe disability.  Unable to walk without assistance, and unable to attend to own bodily needs without assistance.  -CL        Functional Assessment    Outcome Measure Options  AM-PAC 6 Clicks Daily Activity (OT)  -CL  AM-PAC 6 Clicks Daily Activity (OT);Modified [place]  -CL       User Key  (r) = Recorded By, (t) = Taken By, (c) = Cosigned By    Initials Name Provider Type    Quynh Mistry OT Occupational Therapist           Time Calculation:   Time Calculation- OT     Row Name 06/09/20 1116             Time Calculation- OT    OT Start Time  1116  -CL      OT Received On  06/09/20  -CL      OT Goal Re-Cert Due Date  06/18/20  -CL         Timed Charges    66085 - OT Therapeutic Activity Minutes  15  -CL        User Key  (r) = Recorded By, (t) = Taken By, (c) = Cosigned By    Initials Name  Provider Type    CL Quynh Díaz OT Occupational Therapist        Therapy Charges for Today     Code Description Service Date Service Provider Modifiers Qty    90966449649 HC OT EVAL LOW COMPLEXITY 4 6/8/2020 Quynh Díaz OT GO 1    70164721452 HC OT THERAPEUTIC ACT EA 15 MIN 6/9/2020 Quynh Díaz OT GO 1    [id number]  OT THER SUPP EA 15 MIN 6/9/2020 Quynh Díaz OT GO 1               Quynh Díaz OT  6/9/2020

## 2020-06-09 NOTE — THERAPY TREATMENT NOTE
Patient Name: Pernell Adkins  : 1965    MRN: [id number]                              Today's Date: 2020       Admit Date: 2020    Visit Dx:     ICD-10-CM ICD-9-CM   1. Left-sided weakness R53.1 728.87   2. Dysarthria R47.1 784.51   3. Acute ischemic stroke (CMS/HCC) I63.9 434.91   4. Hypokalemia E87.6 276.8   5. Chest pain, unspecified type R07.9 786.50   6. 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 No pertinent surgical history.  General Information     Row Name 20 1514          PT Evaluation Time/Intention    Document Type  therapy note (daily note)  (Pended)   -     Mode of Treatment  physical therapy  (Pended)   -     Row Name 20 1514          General Information    Existing Precautions/Restrictions  fall  (Pended)   -     Barriers to Rehab  none identified  (Pended)   -     Row Name 20 151          Cognitive Assessment/Intervention- PT/OT    Orientation Status (Cognition)  oriented x 3  (Pended)   -     Row Name 20 1514          Safety Issues, Functional Mobility    Safety Issues Affecting Function (Mobility)  awareness of need for assistance;safety precautions follow-through/compliance;insight into deficits/self awareness;safety precaution awareness  (Pended)   -     Impairments Affecting Function (Mobility)   balance;coordination;endurance/activity tolerance;postural/trunk control;strength  (Pended)   -       User Key  (r) = Recorded By, (t) = Taken By, (c) = Cosigned By    Initials Name Provider Type     Andra Wright, PT Student PT Student        Mobility     Row Name 06/09/20 1515          Bed Mobility Assessment/Treatment    Bed Mobility Assessment/Treatment  supine-sit;sit-supine  (Pended)   -     Supine-Sit [place] (Bed Mobility)  supervision;1 person assist;verbal cues  (Pended)   -     Sit-Supine [place] (Bed Mobility)  supervision;verbal cues;1 person assist  (Pended)   -     Assistive Device (Bed Mobility)  bed rails;draw sheet;head of bed elevated  (Pended)   -     Comment (Bed Mobility)  Pt able to transfer from supine to sitting EOB with supervision prior to gait. At the end of treatment pt transferred from sitting EOB to supine, again with supervision. Both required VCs for sequencing.  (Pended)   -     Row Name 06/09/20 1515          Transfer Assessment/Treatment    Comment (Transfers)  STS from EOB without AD, with CGA.  (Pended)   -[facility] Name 06/09/20 [facility]          Sit-Stand Transfer    Sit-Stand [place] (Transfers)  contact guard;1 person assist  (Pended)   -[facility] Name 06/09/20 [facility]          Gait/Stairs Assessment/Training    Gait/Stairs Assessment/Training  gait/ambulation independence  (Pended)   -     [place] Level (Gait)  contact guard;1 person assist  (Pended)   -     Distance in Feet (Gait)  500  (Pended)   -     Pattern (Gait)  step-through  (Pended)   -     Deviations/Abnormal Patterns (Gait)  yov[place] decreased;stride length decreased  (Pended)   -     Bilateral Gait Deviations  forward flexed posture  (Pended)   -     Comment (Gait/Stairs)  Pt ambulated 500 ft with 1 seated rest break with CGA x1 without use of AD. No instances of LOB; mobility limited by fatigue and SOA. VCs for upright posture.   (Pended)   -       User Key  (r) =  Recorded By, (t) = Taken By, (c) = Cosigned By    Initials Name Provider Type     Andra Wright, PT Student PT Student        Obj/Interventions     Row Name 06/09/20 1519          Static Sitting Balance    Level of [place] (Unsupported Sitting, Static Balance)  supervision  (Pended)   -     Sitting Position (Unsupported Sitting, Static Balance)  sitting on edge of bed  (Pended)   -SH     Row Name 06/09/20 1519          Static Standing Balance    Level of [place] (Supported Standing, Static Balance)  contact guard assist;1 person assist  (Pended)   -SH     Row Name 06/09/20 1519          Dynamic Standing Balance    Level of [place], Reaches Outside Midline (Standing, Dynamic Balance)  contact guard assist;1 person assist  (Pended)   -     Comment, Reaches Outside Midline (Standing, Dynamic Balance)  No AD used for standing balance.  (Pended)   -       User Key  (r) = Recorded By, (t) = Taken By, (c) = Cosigned By    Initials Name Provider Type     Andra Wright, PT Student PT Student        Goals/Plan    No documentation.       Clinical Impression     Row Name 06/09/20 1520          Pain Assessment    Additional Documentation  Pain Scale: Numbers Pre/Post-Treatment (Group)  (Pended)   -SH     Row Name 06/09/20 1520          Pain Scale: Numbers Pre/Post-Treatment    Pain Scale: Numbers, Pretreatment  8/10  (Pended)   -     Pain Scale: Numbers, Post-Treatment  10/10  (Pended)   -     Pain Location  back  (Pended)   -     Pre/Post Treatment Pain Comment  Patient with complaints of R shoulder and back pain prior to txt. Following txt, complaints of increased back pain, 10/10. Nursing notified.  (Pended)   -     Pain Intervention(s)  Repositioned;Ambulation/increased activity;Other (Comment)  (Pended)  notified nursing  -SH     Row Name 06/09/20 1520          Vital Signs    Pre Systolic BP Rehab  96  (Pended)   -     Pre Treatment Diastolic BP  66  (Pended)   -     Post Systolic  BP Rehab  126  (Pended)   -SH     Post Treatment Diastolic BP  75  (Pended)   -SH     Pretreatment Heart Rate (beats/min)  95  (Pended)   -SH     Posttreatment Heart Rate (beats/min)  103  (Pended)   -     Pre SpO2 (%)  91  (Pended)   -SH     O2 Delivery Pre Treatment  supplemental O2  (Pended)  3L  -SH     Post SpO2 (%)  96  (Pended)   -SH     O2 Delivery Post Treatment  supplemental O2  (Pended)  3L  -SH     Pre Patient Position  Supine  (Pended)   -     Intra Patient Position  Standing  (Pended)   -SH     Post Patient Position  Supine  (Pended)   -     Row Name 06/09/20 1520          Positioning and Restraints    Pre-Treatment Position  in bed  (Pended)   -SH     Post Treatment Position  bed  (Pended)   -SH     In Bed  supine;call light within reach;with nsg;encouraged to call for assist;side rails up x3;legs elevated;exit alarm on  (Pended)   -       User Key  (r) = Recorded By, (t) = Taken By, (c) = Cosigned By    Initials Name Provider Type    Andra Amezquita, PT Student PT Student        Outcome Measures     Row Name 06/09/20 1523          How much help from another person do you currently need...    Turning from your back to your side while in flat bed without using bedrails?  4  (Pended)   -SH     Moving from lying on back to sitting on the side of a flat bed without bedrails?  3  (Pended)   -SH     Moving to and from a bed to a chair (including a wheelchair)?  3  (Pended)   -SH     Standing up from a chair using your arms (e.g., wheelchair, bedside chair)?  3  (Pended)   -SH     Climbing 3-5 steps with a railing?  3  (Pended)   -SH     To walk in hospital room?  3  (Pended)   -SH     AM-PAC 6 Clicks Score (PT)  19  (Pended)   -     Row Name 06/09/20 1523          Modified Britney Scale    Pre-Stroke Modified [place] Scale  0 - No Symptoms at all.  (Pended)   -     Modified [place] Scale  3 - Moderate disability.  Requiring some help, but able to walk without assistance.  (Pended)   -     Amy  Name 06/09/20 1523          Functional Assessment    Outcome Measure Options  AM-PAC 6 Clicks Basic Mobility (PT)  (Pended)   -       User Key  (r) = Recorded By, (t) = Taken By, (c) = Cosigned By    Initials Name Provider Type    Andra Amezquita, PT Student PT Student        Physical Therapy Education                 Title: PT OT SLP Therapies (In Progress)     Topic: Physical Therapy (In Progress)     Point: Mobility training (In Progress)     Description:   Instruct learner(s) on safety and technique for assisting patient out of bed, chair or wheelchair.  Instruct in the proper use of assistive devices, such as walker, crutches, cane or brace.              Patient Friendly Description:   It's important to get you on your feet again, but we need to do so in a way that is safe for you. Falling has serious consequences, and your personal safety is the most important thing of all.        When it's time to get out of bed, one of us or a family member will sit next to you on the bed to give you support.     If your doctor or nurse tells you to use a walker, crutches, a cane, or a brace, be sure you use it every time you get out of bed, even if you think you don't need it.    Learning Progress Summary           Patient Acceptance, E,D, NR by  at 6/9/2020 1427    Acceptance, E, NR by  at 6/8/2020 1415   Significant Other Acceptance, E,D, NR by  at 6/9/2020 1427                   Point: Home exercise program (Not Started)     Description:   Instruct learner(s) on appropriate technique for monitoring, assisting and/or progressing patient with therapeutic exercises and activities.              Learner Progress:   Not documented in this visit.          Point: Body mechanics (In Progress)     Description:   Instruct learner(s) on proper positioning and spine alignment for patient and/or caregiver during mobility tasks and/or exercises.              Learning Progress Summary           Patient Acceptance, E,D, NR by   at 6/9/2020 1427    Acceptance, E, NR by  at 6/8/2020 1415   Significant Other Acceptance, E,D, NR by  at 6/9/2020 1427                   Point: Precautions (In Progress)     Description:   Instruct learner(s) on prescribed precautions during mobility and gait tasks              Learning Progress Summary           Patient Acceptance, E,D, NR by  at 6/9/2020 1427    Acceptance, E, NR by KG at 6/8/2020 1415   Significant Other Acceptance, E,D, NR by  at 6/9/2020 1427                               User Key     Initials Effective Dates Name Provider Type Discipline     05/22/20 -  Loli Dee, PT Physical Therapist PT     03/19/20 -  Andra Wright, PT Student PT Student PT              PT Recommendation and Plan     Progress: (P) improving  Outcome Summary: (P) Patient demonstrated significant improvements in independence and safety with functional mobility. Pt ambulated 500 ft without AD with CGA. Mobility limited by fatigue, SOA and dizziness. Continue to progress POC as appropriate. Recommend D/C home with assistance and OP therapy.     Time Calculation:   PT Charges     Row Name 06/09/20 1427             Time Calculation    Start Time  1427  (Pended)   -      PT Received On  06/09/20  (Pended)   -      PT Goal Re-Cert Due Date  06/18/20  (Pended)   -         Time Calculation- PT    Total Timed Code Minutes- PT  25 minute(s)  (Pended)   -         Timed Charges    12847 - PT Therapeutic Activity Minutes  25  (Pended)   -        User Key  (r) = Recorded By, (t) = Taken By, (c) = Cosigned By    Initials Name Provider Type     Andra Wright, PT Student PT Student        Therapy Charges for Today     Code Description Service Date Service Provider Modifiers Qty    81492391114 HC PT THERAPEUTIC ACT EA 15 MIN 6/9/2020 Andra Wright, PT Student GP 2          PT G-Codes  Outcome Measure Options: (P) AM-PAC 6 Clicks Basic Mobility (PT)  AM-PAC 6 Clicks Score (PT): (P) 19  AM-PAC 6  Clicks Score (OT): 18  Modified Britney Scale: (P) 3 - Moderate disability.  Requiring some help, but able to walk without assistance.    Andra Wright, PT Student  6/9/2020

## 2020-06-09 NOTE — PLAN OF CARE
Problem: Patient Care Overview  Goal: Plan of Care Review  Flowsheets (Taken 6/9/2020 6799)  Progress: improving  Outcome Summary: Patient demonstrated significant improvements in independence and safety with functional mobility. Pt ambulated 500 ft without AD with CGA. Mobility limited by fatigue, SOA and dizziness. Continue to progress POC as appropriate. Recommend D/C home with assistance and OP therapy.

## 2020-06-09 NOTE — PLAN OF CARE
Problem: Patient Care Overview  Goal: Plan of Care Review  Outcome: Ongoing (interventions implemented as appropriate)  Flowsheets (Taken 6/9/2020 6375)  Plan of Care Reviewed With: patient; spouse  Note:   SLP treatment completed. Will continue to address cognitive-communication in dx tx. May benefit from  SLP services upon d/c. Please see note for further details and recommendations.

## 2020-06-09 NOTE — PROGRESS NOTES
"INTENSIVIST   PROGRESS NOTE     Hospital:  LOS: 1 day      S     Mr. Pernell Adkins, 54 y.o. 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     Interval History:  Awake.  Talks some.  \"Rough night\".  Not much hungry.  Chronic back pain.  On ambient air.    Temp  Min: 96.8 °F (36 °C)  Max: 98.1 °F (36.7 °C)     The patient's relevant past medical, surgical and social history were reviewed and updated in Epic as appropriate.          O     Vitals:  Temp: 96.8 °F (36 °C) (06/09/20 1200) Temp  Min: 96.8 °F (36 °C)  Max: 98.1 °F (36.7 °C)   Temp core:      BP: 99/67 (06/09/20 1330) BP  Min: 92/67  Max: 154/105   Pulse: 77 (06/09/20 1330) Pulse  Min: 61  Max: 104   Resp: 16 (06/09/20 1200) Resp  Min: 15  Max: 20   SpO2: 94 % (06/09/20 1330) SpO2  Min: 91 %  Max: 99 %   Device: nasal cannula (06/09/20 1245)    Flow Rate: 2 (06/09/20 1245) Flow (L/min)  Min: 2  Max: 2     Intake/Ouptut 24 hrs (7:00AM - 6:59 AM)  Intake & Output (last 3 days)       06/06 0701 - 06/07 0700 06/07 0701 - 06/08 0700 06/08 0701 - 06/09 0700 06/09 0701 - 06/10 0700    P.O.   410 480    I.V. (mL/kg)  100 (1.1)      IV Piggyback   200     Total Intake(mL/kg)  100 (1.1) 610 (7.1) 480 (5.6)    Urine (mL/kg/hr)   1850 (0.9) 225 (0.3)    Stool   0     Total Output   1850 225    Net  +100 -1240 +255            Stool Unmeasured Occurrence   1 x         Medications (drips):    niCARdipine       Physical Examination  Telemetry:  Rhythm: normal sinus rhythm (06/09/20 1000)     QTc Interval (Sec): 0.44 (06/08/20 2000)   Constitutional:  No acute distress. "   Cardiovascular: RRR.   Normal heart sounds.  No murmurs, gallop or rub.   Respiratory: Normal breath sounds  No adventitious sounds.   Abdominal:  Soft with no tenderness.  No distension.   No HSM.   Extremities: Warm.  Dry.  No cyanosis.  No Edema   Neurological:   Awake.  Best Eye Response: 3-->(E3) to speech (06/09/20 1000)  Best Motor Response: 6-->(M6) obeys commands (06/09/20 1000)  Best Verbal Response: 5-->(V5) oriented (06/09/20 1000)  [place] Coma Scale Score: 14 (06/09/20 1000)       Hematology:  Results from last 7 days   Lab Units 06/09/20  0622 06/08/20  0411 06/08/20  0408   WBC 10*3/mm3 3.63 3.73  --    HEMOGLOBIN g/dL 13.5 13.6  --    HEMOGLOBIN, POC g/dL  --   --  13.9   MCV fL 88.3 88.1  --    PLATELETS 10*3/mm3 197 186  --    NEUTROS ABS 10*3/mm3 2.30 2.07  --    LYMPHS ABS 10*3/mm3 0.98 1.32  --    EOS ABS 10*3/mm3 0.00 0.00  --    IMMATURE GRANS (ABS) 10*3/mm3 0.00 0.01  --        Chemistry:  Estimated Creatinine Clearance: 123.7 mL/min (by C-G formula based on SCr of 0.81 mg/dL).    Results from last 7 days   Lab Units 06/09/20  0622 06/08/20  1649 06/08/20  0404   SODIUM mmol/L 139  --   --    POTASSIUM mmol/L 3.6 4.2  --    CHLORIDE mmol/L 102  --   --    CO2 mmol/L 24.0  --   --    BUN mg/dL 7  --   --    CREATININE mg/dL 0.81  --  1.10   GLUCOSE mg/dL 93  --   --    CALCIUM mg/dL 9.4  --   --    ANION GAP mmol/L 13.0  --   --      Results from last 7 days   Lab Units 06/08/20  0412   MAGNESIUM mg/dL 2.1     Results from last 7 days   Lab Units 06/08/20  0411   ALT (SGPT) U/L 5   AST (SGOT) U/L 17     Cardiac Labs:  Results from last 7 days   Lab Units 06/08/20  2218 06/08/20  0411   TROPONIN T ng/mL <0.010 <0.010     Images:  Ct Angiogram Head    Result Date: 6/8/2020  Negative CT angiogram of the head and neck. No intracranial or extracranial arterial flow limiting stenosis or aneurysm. Widely patent cervical carotid bifurcations bilaterally. Signer Name: Charlie Hardy MD  Signed:  6/8/2020 4:33 AM  Workstation [facility]: [facility]  [facility]    Ct Head Without Contrast    Result Date: 6/8/2020  Senescent changes without acute abnormality. Signer Name: Phu Shin MD  Signed: 6/8/2020 6:10 AM  Workstation Name: [facility]  [facility]    Ct Angiogram Neck    Result Date: 6/8/2020  Negative CT angiogram of the head and neck. No intracranial or extracranial arterial flow limiting stenosis or aneurysm. Widely patent cervical carotid bifurcations bilaterally. Signer Name: Charlie Hardy MD  Signed: 6/8/2020 4:33 AM  Workstation Name: [facility]  [facility]    Mri Brain Without Contrast    Result Date: 6/9/2020  1.  No acute infarction.  2. Trace, if any, chronic small vessel ischemic disease in the supratentorial white matter.  3.  Near total opacification of the entirety of the paranasal sinuses with air-fluid levels and opacifications of fluid and edema as detailed above throughout the sinuses concerning for sinusitis.   D:  06/09/2020 E:  06/09/2020       Ct Angiogram Chest    Result Date: 6/8/2020  1.  No evidence of pulmonary embolism or other acute vascular abnormality within the chest. 2.  Mild dependent posterior lung base atelectasis. Lungs otherwise clear. 3.  Noncalcified 6 mm peripheral right lower lobe pulmonary nodule. See below. Lung nodule management recommendation: Current published guidelines recommend initial follow-up CT in 6-12 months, and again in 18-24 months for uncomplicated pulmonary nodules measuring 6 to 8 mm in low risk patients (Fleischner Society guidelines, 2017). Signer Name: Charlie Hardy MD  Signed: 6/8/2020 4:59 AM  Workstation [facility]: [facility]  [facility]    Ct Head Without Contrast Stroke Protocol    Result Date: 6/8/2020  1.  No acute intracranial abnormality. 2.  Extensive bilateral paranasal sinus costal disease. NOTIFICATION: Critical  "Value/emergent results were relayed from me to the ED treatment team by telephone through the CT technologist, Stoney, at 04:07 on 6/8/2020. Signer Name: Charlie Hardy MD  Signed: 6/8/2020 4:09 AM  [facility]station [facility]: [facility]  [facility]    Ct Cerebral Perfusion With & Without Contrast    Result Date: 6/8/2020  Negative CT perfusion study. No evidence of cerebral ischemia or core infarction. Signer Name: Charlie Hardy MD  Signed: 6/8/2020 4:26 AM  Workstation Name: [facility]  [facility]iology [facility]      Echo:  Results for orders placed during the hospital encounter of 06/08/20   Adult Transthoracic Echo Complete W/ Cont if Necessary Per Protocol (With Agitated Saline)    Narrative · Cardiac chamber sizes and wall thicknesses are normal. The calculated   left ventricular ejection fraction is 65%.  · The aortic and mitral valves are normal in structure and function.  · An agitated saline study is technically limited and insufficient for   diagnosis. If intracardiac shunting is suspected, a transcranial Doppler   study is suggested.  · There are no other important findings on this study.          Results: Reviewed.  I reviewed the patient's new laboratory and imaging results.  I independently reviewed the patient's new images.    Medications: Reviewed.    Assessment/Plan   A / P     Assessment:    54 y.o.male, admitted on 6/8/2020 with Left-sided weakness [R53.1]:     1. Questionable AIS, s/p tPA (Left sided weakness and dysarthria)  Vs \"functional\" deficit as per Stroke Team.  1. Negative perfusion scan  2. Negative MRI  2. HTN  3. Extensive paranasal Sinusitis (as per CT and MRI).  4. Non smoker  5. Pulmonary  1. Ashtma no exacerbation  2. RLL 6 mm SPN  6. Chronic back pain  7. Caffeine abuse  8. Anxiety disorder  9. Glucose: No h/o DM    Results from last 7 days   Lab Units 06/08/20  1119 06/08/20  0453   GLUCOSE mg/dL 93 116     Lab Results   Lab Value " Date/Time    HGBA1C 5.10 06/08/2020 0411       Nutrition Support: Patient isn't on Tube Feeding   Modulars: Patient doesn't have any tube feeding modular orders   Diet: Diet Soft Texture; Whole Foods; Thin; Cardiac   Advance Directives: Code Status and Medical Interventions:   Ordered at: 06/08/20 0455     Code Status:    CPR     Medical Interventions (Level of Support Prior to Arrest):    Full        Plan:    1. Discussed with Dr. Roman. Improved. Probable not a stroke.  2. Disposition: Transfer to Telemetry Unit./Home soon.    Plan of care and goals reviewed during interdisciplinary rounds.  I discussed the patient's findings and my recommendations with patient and nursing staff    Level of Risk is High due to:  illness with threat to life or bodily function.     OK to floor.  We will follow as needed once out of the Intensive Care Unit.  Hospitalist Team will assist with medical management and assume Primary Attending, once on the Floor.    Thank you.    Time: 25 minutes, in direct patient care, with the patient and/or on the bobo coordinating care with other health care providers.     I have spent > 50% percent of this time, counseling and discussing management.     Angel Singh MD, FACP, FCCP, CNSC  Intensive Care Medicine, Nutrition Support and Pulmonary Medicine     [x]  Primary Attending  []  Consultant

## 2020-06-09 NOTE — NURSING NOTE
Stroke Navigator Note    Called to bedside for worsening neurological exam (NIHSS 7-->13).    21:00: Upon my evaluation the patient appears anxious and is complaining of chest and back pain.  EKG and troponin were ordered by Intensivist TOBIAS.  He has received his home dose of Xanax and Norco.      He currently is able to answer questions appropriately and move his left upper and lower extremity with some difficulty.  After reviewing the chart, it appears that this has fluctuated throughout the day.  He continues to have severe left sided sensory loss.  Exam is limited secondary to the patient's complaint of chest pain.      2300: EKG and troponin were negative for cardiac etiology of symptoms.  The RN states that his neurological exam remains the same (NIHSS 13).      The patient is currently resting in the bed with his eyes closed and in no apparent distress.  He awakens easily to verbal stimulation, answers questions appropriately, and follow commands.  He continues to have left sided weakness (leg>arm) and severe sensory loss.  With repeated encouragement, the patient is able to lift his left upper and lower extremities and maintain antigravity strength; 4/5 in LUE and 4/5 in LLE.  RN currently at beside and confirms this is improved from her last examination.  His vital signs remain stable.    The patient was seen by Dr. Roman this afternoon who also noted inconsistencies in his exam and felt that his symptoms are more likely related to a functional neurological cause.  Will await results from his MRI brain without scheduled for 0400.    Katherine Arguelles RN Extender/Stroke Navigator

## 2020-06-09 NOTE — PLAN OF CARE
Problem: Patient Care Overview  Goal: Plan of Care Review  Outcome: Ongoing (interventions implemented as appropriate)  Flowsheets (Taken 6/9/2020 1116)  Progress: improving  Plan of Care Reviewed With: patient  Outcome Summary: Pt demo significantly improved independence by ambulating 300 ft w/ Min Ax1+1. Pt conts to be limited d/t c/o pain. Recommend cont skilled IPOT POC. Recommend pt DC home w/ assist and OP rehab.

## 2020-06-09 NOTE — PROGRESS NOTES
Discharge Planning Assessment  [facility]     Patient Name: Pernell Adkins  MRN: [id number]  Today's Date: 6/9/2020    Admit Date: 6/8/2020    Discharge Needs Assessment     Row Name 06/09/20 1141       Living Environment    Lives With  spouse    Name(s) of Who Lives With Patient  Wife Breanna [phone]    Current Living Arrangements  home/apartment/condo    Primary Care Provided by  self    Provides Primary Care For  no one    Family Caregiver if Needed  spouse    Family Caregiver Names  Wife Breanna     Quality of Family Relationships  helpful;supportive    Able to Return to Prior Arrangements  yes    Living Arrangement Comments  Plan home with wife        Resource/Environmental Concerns    Resource/Environmental Concerns  none    Transportation Concerns  car, none       Transition Planning    Patient/Family Anticipates Transition to  home with family    Patient/Family Anticipated Services at Transition  none    Transportation Anticipated  family or friend will provide       Discharge Needs Assessment    Readmission Within the Last 30 Days  no previous admission in last 30 days    Equipment Currently Used at Home  nebulizer He has a str.cane and rolling walker in the home if needed.     Anticipated Changes Related to Illness  none    Equipment Needed After Discharge  nebulizer    Discharge Coordination/Progress  Plan home at [facility] with wife         Discharge Plan     Row Name 06/09/20 1145       Plan    Plan  Home/wife     Patient/Family in Agreement with Plan  yes    Plan Comments  I spoke with wife and patient at bedside. Patient lives in [facility] with wife. Patient independent PTA, uses a nebulizer and has a cane and RW in the home if needed. I spoke with PT this am after she worked with patient and she agrees he can go home instead of IP rehab. Patient wants to go home. Radha @ [phone]    Final Discharge Disposition Code  01 - home or self-care        Destination      Coordination has not been started for this  encounter.      Durable Medical Equipment      Coordination has not been started for this encounter.      Dialysis/Infusion      Coordination has not been started for this encounter.      Home Medical Care      Coordination has not been started for this encounter.      Therapy      Coordination has not been started for this encounter.      Community Resources      Coordination has not been started for this encounter.          Demographic Summary     Row Name 06/09/20 1134       General Information    Admission Type  inpatient    Referral Source  admission list    Reason for Consult  discharge planning    Preferred Language  English     Used During This Interaction  no       Contact Information    Permission Granted to Share Info With      Contact Information Obtained for      Contact Information Comments  PCP: Epifanio Shankar, confirmed by wife.         Functional Status     Row Name 06/09/20 1137       Functional Status    Usual Activity Tolerance  good    Current Activity Tolerance  moderate    Functional Status Comments  Chronic back pain        Functional Status, IADL    Medications  independent    Meal Preparation  independent    Housekeeping  independent    Laundry  independent    Shopping  independent    IADL Comments  Patient has coverage for medications        Mental Status Summary    Recent Changes in Mental Status/Cognitive Functioning  ability to understand       Employment/    Employment Status  disabled        Psychosocial    No documentation.       Abuse/Neglect    No documentation.       Legal    No documentation.       Substance Abuse    No documentation.       Patient Forms    No documentation.           Moriah Sue RN

## 2020-06-10 VITALS
OXYGEN SATURATION: 96 % | HEART RATE: 65 BPM | DIASTOLIC BLOOD PRESSURE: 83 MMHG | HEIGHT: 70 IN | WEIGHT: 189.38 LBS | TEMPERATURE: 97.5 F | BODY MASS INDEX: 27.11 KG/M2 | SYSTOLIC BLOOD PRESSURE: 107 MMHG | RESPIRATION RATE: 16 BRPM

## 2020-06-10 LAB
ANION GAP SERPL CALCULATED.3IONS-SCNC: 9 MMOL/L (ref 5–15)
BASOPHILS # BLD AUTO: 0 10*3/MM3 (ref 0–0.2)
BASOPHILS NFR BLD AUTO: 0 % (ref 0–1.5)
BUN BLD-MCNC: 10 MG/DL (ref 6–20)
BUN/CREAT SERPL: 10.8 (ref 7–25)
CALCIUM SPEC-SCNC: 9.3 MG/DL (ref 8.6–10.5)
CHLORIDE SERPL-SCNC: 107 MMOL/L (ref 98–107)
CO2 SERPL-SCNC: 25 MMOL/L (ref 22–29)
CREAT BLD-MCNC: 0.93 MG/DL (ref 0.76–1.27)
DEPRECATED RDW RBC AUTO: 49.1 FL (ref 37–54)
EOSINOPHIL # BLD AUTO: 0 10*3/MM3 (ref 0–0.4)
EOSINOPHIL NFR BLD AUTO: 0 % (ref 0.3–6.2)
ERYTHROCYTE [DISTWIDTH] IN BLOOD BY AUTOMATED COUNT: 14.8 % (ref 12.3–15.4)
GFR SERPL CREATININE-BSD FRML MDRD: 84 ML/MIN/1.73
GLUCOSE BLD-MCNC: 79 MG/DL (ref 65–99)
HCT VFR BLD AUTO: 40.7 % (ref 37.5–51)
HGB BLD-MCNC: 12.9 G/DL (ref 13–17.7)
IMM GRANULOCYTES # BLD AUTO: 0 10*3/MM3 (ref 0–0.05)
IMM GRANULOCYTES NFR BLD AUTO: 0 % (ref 0–0.5)
LYMPHOCYTES # BLD AUTO: 1.02 10*3/MM3 (ref 0.7–3.1)
LYMPHOCYTES NFR BLD AUTO: 29.3 % (ref 19.6–45.3)
MAGNESIUM SERPL-MCNC: 2 MG/DL (ref 1.6–2.6)
MCH RBC QN AUTO: 28.6 PG (ref 26.6–33)
MCHC RBC AUTO-ENTMCNC: 31.7 G/DL (ref 31.5–35.7)
MCV RBC AUTO: 90.2 FL (ref 79–97)
MONOCYTES # BLD AUTO: 0.34 10*3/MM3 (ref 0.1–0.9)
MONOCYTES NFR BLD AUTO: 9.8 % (ref 5–12)
NEUTROPHILS # BLD AUTO: 2.12 10*3/MM3 (ref 1.7–7)
NEUTROPHILS NFR BLD AUTO: 60.9 % (ref 42.7–76)
NRBC BLD AUTO-RTO: 0 /100 WBC (ref 0–0.2)
PLATELET # BLD AUTO: 174 10*3/MM3 (ref 140–450)
PMV BLD AUTO: 10.2 FL (ref 6–12)
POTASSIUM BLD-SCNC: 4.4 MMOL/L (ref 3.5–5.2)
RBC # BLD AUTO: 4.51 10*6/MM3 (ref 4.14–5.8)
SODIUM BLD-SCNC: 141 MMOL/L (ref 136–145)
WBC NRBC COR # BLD: 3.48 10*3/MM3 (ref 3.4–10.8)

## 2020-06-10 PROCEDURE — 99238 HOSP IP/OBS DSCHRG MGMT 30/<: CPT | Performed by: NURSE PRACTITIONER

## 2020-06-10 PROCEDURE — 80048 BASIC METABOLIC PNL TOTAL CA: CPT | Performed by: INTERNAL MEDICINE

## 2020-06-10 PROCEDURE — 97116 GAIT TRAINING THERAPY: CPT

## 2020-06-10 PROCEDURE — 83735 ASSAY OF MAGNESIUM: CPT | Performed by: INTERNAL MEDICINE

## 2020-06-10 PROCEDURE — 94799 UNLISTED PULMONARY SVC/PX: CPT

## 2020-06-10 PROCEDURE — 85025 COMPLETE CBC W/AUTO DIFF WBC: CPT | Performed by: INTERNAL MEDICINE

## 2020-06-10 RX ORDER — ASPIRIN 325 MG
325 TABLET ORAL DAILY
Qty: 30 TABLET | Refills: 2 | Status: SHIPPED | OUTPATIENT
Start: 2020-06-11

## 2020-06-10 RX ORDER — ATORVASTATIN CALCIUM 80 MG/1
80 TABLET, FILM COATED ORAL NIGHTLY
Qty: 30 TABLET | Refills: 3 | Status: SHIPPED | OUTPATIENT
Start: 2020-06-10

## 2020-06-10 RX ADMIN — METOPROLOL TARTRATE 50 MG: 50 TABLET, FILM COATED ORAL at 08:09

## 2020-06-10 RX ADMIN — AMLODIPINE BESYLATE 5 MG: 5 TABLET ORAL at 08:09

## 2020-06-10 RX ADMIN — HYDROCHLOROTHIAZIDE 25 MG: 25 TABLET ORAL at 08:09

## 2020-06-10 RX ADMIN — SERTRALINE HYDROCHLORIDE 50 MG: 50 TABLET ORAL at 08:09

## 2020-06-10 RX ADMIN — TAMSULOSIN HYDROCHLORIDE 0.4 MG: 0.4 CAPSULE ORAL at 08:09

## 2020-06-10 RX ADMIN — HYDROCODONE BITARTRATE AND ACETAMINOPHEN 1 TABLET: 7.5; 325 TABLET ORAL at 14:26

## 2020-06-10 RX ADMIN — SODIUM CHLORIDE, PRESERVATIVE FREE 10 ML: 5 INJECTION INTRAVENOUS at 08:13

## 2020-06-10 RX ADMIN — IPRATROPIUM BROMIDE AND ALBUTEROL SULFATE 3 ML: 2.5; .5 SOLUTION RESPIRATORY (INHALATION) at 12:43

## 2020-06-10 RX ADMIN — HYDROCODONE BITARTRATE AND ACETAMINOPHEN 1 TABLET: 7.5; 325 TABLET ORAL at 02:15

## 2020-06-10 RX ADMIN — HYDROCODONE BITARTRATE AND ACETAMINOPHEN 1 TABLET: 7.5; 325 TABLET ORAL at 08:09

## 2020-06-10 RX ADMIN — GABAPENTIN 400 MG: 400 CAPSULE ORAL at 08:09

## 2020-06-10 RX ADMIN — ASPIRIN 325 MG ORAL TABLET 325 MG: 325 PILL ORAL at 08:09

## 2020-06-10 RX ADMIN — IPRATROPIUM BROMIDE AND ALBUTEROL SULFATE 3 ML: 2.5; .5 SOLUTION RESPIRATORY (INHALATION) at 08:15

## 2020-06-10 RX ADMIN — BUDESONIDE AND FORMOTEROL FUMARATE DIHYDRATE 2 PUFF: 160; 4.5 AEROSOL RESPIRATORY (INHALATION) at 08:16

## 2020-06-10 NOTE — PROGRESS NOTES
Case Management Discharge Note      Final Note: Plan is home with spouse. Spouse will transport. Denies any discharge needs.         Destination      No service has been selected for the patient.      Durable Medical Equipment      No service has been selected for the patient.      Dialysis/Infusion      No service has been selected for the patient.      Home Medical Care      No service has been selected for the patient.      Therapy      No service has been selected for the patient.      Community Resources      No service has been selected for the patient.             Final Discharge Disposition Code: 01 - home or self-care

## 2020-06-10 NOTE — DISCHARGE SUMMARY
"DISCHARGE SUMMARY     Admit date: 6/8/2020  Date of Discharge:  6/10/2020    Discharge Diagnoses  Active Hospital Problems    Diagnosis   • **Left-sided weakness   • Hypertension   • Chronic pain (Chronic Norco)   • Caffeine abuse (48oz Red Bull a day))   • Hypokalemia       History reviewed. No pertinent surgical history.    History of Present Illness    Patient is a 54 y.o. male presented with: Left-sided weakness r/o stroke    According to the son, the patient was in his normal state of health on 6/8 last evening when he developed left-sided weakness, slurred speech, and left facial droop he awoke to these symptoms at around 1:00am.  EMS was called and the patient was noted to be hypoglycemic with glucose of 50 given and was given D10. He was transported to [facility].      On ED arrival GCS 15 and NIH 8. CTH, CTP, and CTA head/neck negative. Blood glucose was 116.  There were additional complaints of \"crushing\" chest pain with EKG negative for ischemia and subsequent CTA chest was unremarkable. He was deemed appropriate for thrombolytic therapy which was initiated at 0425.     Patient has no prior history of CVA or neurologic event.     He has a history of chronic and recurrent chest pain.  He has had several heart catheterizations in the past.  The last was in 2015 and was \"okay\".     He has chronic pain and is on Norco 10 4 times a day and Neurontin.  Also has chronic anxiety and is on Xanax daily.  According to his wife he abuses caffeine-based drinks and drinks 6-8 8oz Red Bulls a day.  He has gone through 16 drinks in the 2 days prior to this admission.     He is a non-smoker.  He has a history of chronic persistent asthma and is taken care of at [facility].  He is on Fasenra and had been on Xolair in the past.  He has a history of recurrent respiratory tract infections and is on chronic suppressive azithromycin.  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 He was started on Lipitor and following 24 hour CT scan ASA was started. 24 hour post tPA CT was negative for bleed. He had an MRI of the brain which was negative for ischemia. On 6/10 he was felt stable and ready for discharge home. The patient stated he would rather stay in the hospital to receive his medications but after discussion he understands that he should be discharged home as there is no indication for keeping him in the hospital. He was counseled about his use of caffeine based drinks (Red Bull) and advised to decrease and wean off the drinks.     Physical Exam   Constitutional: He is oriented to person, place, and time. He appears well-developed and well-nourished. No distress.   HENT:   Head: Normocephalic and atraumatic.   Eyes: Pupils are equal, round, and reactive to light.   Neck: Normal range of motion. Neck supple. No tracheal deviation present.   Cardiovascular: Normal rate, regular rhythm, normal heart sounds and intact distal pulses. Exam reveals no friction rub.   No murmur heard.  Pulmonary/Chest: Effort normal and breath sounds normal. No respiratory distress. He has no wheezes. He has no rales.   Abdominal: Soft. Bowel sounds are normal. He exhibits no distension. There is no tenderness.   Musculoskeletal: Normal range of motion. He exhibits no edema, tenderness or deformity.   Neurological: He is alert and oriented to person, place, and time. No cranial nerve deficit or sensory deficit. Coordination normal.   Skin: Skin is warm and dry. Capillary refill takes less than 2 seconds. No rash noted.   Psychiatric: He has a normal mood and affect. His behavior is normal. Judgment and thought content normal.     /83 (BP Location: Right arm, Patient Position: Lying)   Pulse 65   Temp 97.5 °F (36.4 °C) (Oral)   Resp 16   Ht  "177.8 cm (70\")   Wt 85.9 kg (189 lb 6 oz)   SpO2 96%   BMI 27.17 kg/m²       Procedures Performed: None    Consults: Diogo Roman MD, Neurology    Pertinent Test Results:   Results from last 7 days   Lab Units 06/10/20  0435   WBC 10*3/mm3 3.48   HEMOGLOBIN g/dL 12.9*   HEMATOCRIT % 40.7   PLATELETS 10*3/mm3 174     Results from last 7 days   Lab Units 06/10/20  0435 06/09/20  0622  06/08/20  1649 06/08/20  0404   SODIUM mmol/L 141 139  --   --   --    POTASSIUM mmol/L 4.4 3.6  --  4.2  --    CHLORIDE mmol/L 107 102  --   --   --    CO2 mmol/L 25.0 24.0  --   --   --    BUN mg/dL 10 7  --   --   --    CREATININE mg/dL 0.93 0.81  --   --  1.10   GLUCOSE mg/dL 79 93   < >  --   --    CALCIUM mg/dL 9.3 9.4  --   --   --     < > = values in this interval not displayed.     Results from last 7 days   Lab Units 06/08/20  0411   HEMOGLOBIN A1C % 5.10       Condition on Discharge:  Stable    Discharge Disposition: Home or Self Care    Discharge Medications:      Discharge Medications      New Medications      Instructions Start Date   aspirin 325 MG tablet   325 mg, Oral, Daily   Start Date:  June 11, 2020     atorvastatin 80 MG tablet  Commonly known as:  LIPITOR   80 mg, Oral, Nightly         Continue These Medications      Instructions Start Date   albuterol sulfate  (90 Base) MCG/ACT inhaler  Commonly known as:  PROVENTIL HFA;VENTOLIN HFA;PROAIR HFA   2 puffs, Inhalation, Every 4 Hours PRN      amLODIPine 5 MG tablet  Commonly known as:  NORVASC   5 mg, Oral, Daily      azithromycin 250 MG tablet  Commonly known as:  ZITHROMAX   250 mg, Oral, Daily      Beclomethasone Dipropionate 80 MCG/ACT aerosol solution   2 sprays, Nasal, Daily      diazePAM 10 MG tablet  Commonly known as:  VALIUM   10 mg, Oral, 3 Times Daily      Fasenra 30 MG/ML solution prefilled syringe  Generic drug:  Benralizumab   30 mg, Subcutaneous, Take As Directed, Every 8 weeks as home injection       gabapentin 400 MG " capsule  Commonly known as:  NEURONTIN   400 mg, Oral, 3 Times Daily      hydroCHLOROthiazide 25 MG tablet  Commonly known as:  HYDRODIURIL   25 mg, Oral, Daily      HYDROcodone-acetaminophen  MG per tablet  Commonly known as:  NORCO   1 tablet, Oral, 4 Times Daily      metFORMIN  MG 24 hr tablet  Commonly known as:  GLUCOPHAGE-XR   1,000 mg, Oral, Daily With Dinner      metoprolol tartrate 50 MG tablet  Commonly known as:  LOPRESSOR   50 mg, Oral, 2 Times Daily      montelukast 10 MG tablet  Commonly known as:  SINGULAIR   10 mg, Oral, Nightly      predniSONE 10 MG tablet  Commonly known as:  DELTASONE   10 mg, Oral, Daily      sertraline 50 MG tablet  Commonly known as:  ZOLOFT   50 mg, Oral, Daily      tamsulosin 0.4 MG capsule 24 hr capsule  Commonly known as:  FLOMAX   1 capsule, Oral, Daily      Trelegy Ellipta 100-62.5-25 MCG/INH aerosol powder   Generic drug:  Fluticasone-Umeclidin-Vilant   1 puff, Inhalation, Daily - RT         Stop These Medications    simvastatin 20 MG tablet  Commonly known as:  ZOCOR            Discharge Diet: Diet Regular; Thin; Cardiac    Activity at Discharge: As tolerated    Follow-up Appointments  No future appointments.  Additional Instructions for the Follow-ups that You Need to Schedule     Discharge Follow-up with PCP   As directed       Currently Documented PCP:    Epifanio Shankar MD    PCP Phone Number:    [phone]     Follow Up Details:  2 weeks               Test Results Pending at Discharge       Code Status and Medical Interventions:   Ordered at: 06/08/20 0455     Code Status:    CPR     Medical Interventions (Level of Support Prior to Arrest):    Full       TOBIAS Alonso, ACNP-BC  06/10/20  15:03    Discharge Time Spent: 25 Minutes

## 2020-06-10 NOTE — PROGRESS NOTES
Continued Stay Note   [facility]     Patient Name: Pernell Adkins  MRN: [id number]  Today's Date: 6/10/2020    Admit Date: 6/8/2020    Discharge Plan     Row Name 06/10/20 0922       Plan    Plan  Home with spouse    Patient/Family in Agreement with Plan  yes    Plan Comments  Spoke with patient at bedside. Plan is home with spouse. Patient has a fever overnight. Denies any discharge needs. CM will continue to follow.    Final Discharge Disposition Code  01 - home or self-care        Discharge Codes    No documentation.             Charlie De La Torre RN

## 2020-06-10 NOTE — PLAN OF CARE
Problem: Patient Care Overview  Goal: Plan of Care Review  Outcome: Ongoing (interventions implemented as appropriate)  Flowsheets (Taken 6/10/2020 5914)  Progress: improving  Plan of Care Reviewed With: patient  Outcome Summary: Pt performed all bed mobility and transfer with SPV, and increased ambulation distance to 700' with CGA and no AD. Pt required one standing rest break due to low back pain; incorporated lumbar mobility exercises from EOB and supine without relief (NSG notified). Pt able to perform dynamic balance activities safely and with CGA. Recommend home with assist at d/c.

## 2020-06-10 NOTE — THERAPY TREATMENT NOTE
Patient Name: Pernell Adkins  : 1965    MRN: [id number]                              Today's Date: 6/10/2020       Admit Date: 2020    Visit Dx:     ICD-10-CM ICD-9-CM   1. Left-sided weakness R53.1 728.87   2. Dysarthria R47.1 784.51   3. Acute ischemic stroke (CMS/[facility]) I63.9 434.91   4. Hypokalemia E87.6 276.8   5. Chest pain, unspecified type R07.9 786.50   6. 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 No pertinent surgical history.  General Information     Row Name 06/10/20 0751          PT Evaluation Time/Intention    Document Type  therapy note (daily note)  -AC     Mode of Treatment  physical therapy  -AC     Row Name 06/10/20 075          General Information    Existing Precautions/Restrictions  fall  -AC     Row Name 06/10/20 075          Cognitive Assessment/Intervention- PT/OT    Orientation Status (Cognition)  oriented x 3  -AC     Row Name 06/10/20 0751          Safety Issues, Functional Mobility    Safety Issues Affecting Function (Mobility)  insight into deficits/self awareness;sequencing abilities  -AC     Impairments Affecting Function (Mobility)  endurance/activity tolerance;pain;strength  -AC       User Key  (r) = Recorded By, (t) = Taken By, (c) = Cosigned By    Initials Name Provider Type    AC Rosie Herman, PT Physical  Therapist        Mobility     [facility] Name 06/10/20 [facility]          Bed Mobility Assessment/Treatment    Supine-Sit [place] (Bed Mobility)  supervision  -     Sit-Supine [place] (Bed Mobility)  supervision  -     Assistive Device (Bed Mobility)  head of bed elevated  -AC     [facility] Name 06/10/20 [facility]          Sit-Stand Transfer    Sit-Stand [place] (Transfers)  supervision  -[facility] Name 06/10/20 [facility]          Gait/Stairs Assessment/Training    [place] Level (Gait)  contact guard  -AC     Assistive Device (Gait)  -- No AD  -AC     Distance in Feet (Gait)  700'  -AC     Deviations/Abnormal Patterns (Gait)  yovany decreased;stride length decreased  -AC     Bilateral Gait Deviations  weight shift ability decreased;heel strike decreased  -AC     Comment (Gait/Stairs)  Pt demonstrates limited arm swing and weight shift, requiring one standing rest break d/t LBP.   -AC       User Key  (r) = Recorded By, (t) = Taken By, (c) = Cosigned By    Initials Name Provider Type    Rosie Siegel, PT Physical Therapist        Obj/Interventions     [facility] Name 06/10/20 [facility]          Dynamic Standing Balance    Level of [place], Reaches Outside Midline (Standing, Dynamic Balance)  contact guard assist  -AC     Comment, Reaches Outside Midline (Standing, Dynamic Balance)  Able to perform 360 turn both ways and  object from the floor safely.   -AC       User Key  (r) = Recorded By, (t) = Taken By, (c) = Cosigned By    Initials Name Provider Type    Rosie Siegel, PT Physical Therapist        Goals/Plan    No documentation.       Clinical Impression     [facility] Name 06/10/20 [facility]          Pain Scale: Numbers Pre/Post-Treatment    Pain Scale: Numbers, Pretreatment  0/10 - no pain  -AC     Pain Scale: Numbers, Post-Treatment  9/10  -AC     Pain Location - Orientation  lower  -AC     Pain Location  back  -AC     Pain Intervention(s)  Medication (See MAR);Repositioned;Ambulation/increased activity  Notified NSG  -AC     Row Name 06/10/20 0751          Plan of Care Review    Plan of Care Reviewed With  patient  -AC     Progress  improving  -AC     Outcome Summary  Pt performed all bed mobility and transfer with SPV, and increased ambulation distance to 700' with CGA and no AD. Pt required one standing rest break due to low back pain; incorporated lumbar mobility exercises from EOB and supine without relief (NSG notified). Pt able to perform dynamic balance activities safely and with CGA. Recommend home with assist at d/c.   -AC     Row Name 06/10/20 0751          Vital Signs    Pre Systolic BP Rehab  101  -AC     Pre Treatment Diastolic BP  72  -AC     Post Systolic BP Rehab  121  -AC     Post Treatment Diastolic BP  90  -AC     Pretreatment Heart Rate (beats/min)  62  -AC     Posttreatment Heart Rate (beats/min)  65  -AC     Pre SpO2 (%)  95  -AC     O2 Delivery Pre Treatment  room air  -AC     Post SpO2 (%)  96  -AC     O2 Delivery Post Treatment  room air  -AC     Pre Patient Position  Supine  -AC     Intra Patient Position  Standing  -AC     Post Patient Position  Supine  -AC     Row Name 06/10/20 0751          Positioning and Restraints    Pre-Treatment Position  in bed  -AC     Post Treatment Position  bed  -AC     In Bed  notified nsg;supine;call light within reach;encouraged to call for assist;with nsg;side rails up x2  -AC       User Key  (r) = Recorded By, (t) = Taken By, (c) = Cosigned By    Initials Name Provider Type    AC Rosie Herman, PT Physical Therapist        Outcome Measures     Row Name 06/10/20 0751          How much help from another person do you currently need...    Turning from your back to your side while in flat bed without using bedrails?  4  -AC     Moving from lying on back to sitting on the side of a flat bed without bedrails?  4  -AC     Moving to and from a bed to a chair (including a wheelchair)?  3  -AC     Standing up from a chair using your arms (e.g., wheelchair,  bedside chair)?  4  -AC     Climbing 3-5 steps with a railing?  3  -AC     To walk in hospital room?  3  -AC     AM-PAC 6 Clicks Score (PT)  21  -     Row Name 06/10/20 0751          Modified Meeker Scale    Pre-Stroke Modified Meeker Scale  1 - No significant disability despite symptoms.  Able to carry out all usual duties and activities.  -     Modified Britney Scale  3 - Moderate disability.  Requiring some help, but able to walk without assistance.  -     Row Name 06/10/20 0751          Functional Assessment    Outcome Measure Options  AM-PAC 6 Clicks Basic Mobility (PT)  -       User Key  (r) = Recorded By, (t) = Taken By, (c) = Cosigned By    Initials Name Provider Type     Rosie Herman, PT Physical Therapist        Physical Therapy Education                 Title: PT OT SLP Therapies (In Progress)     Topic: Physical Therapy (In Progress)     Point: Mobility training (In Progress)     Description:   Instruct learner(s) on safety and technique for assisting patient out of bed, chair or wheelchair.  Instruct in the proper use of assistive devices, such as walker, crutches, cane or brace.              Patient Friendly Description:   It's important to get you on your feet again, but we need to do so in a way that is safe for you. Falling has serious consequences, and your personal safety is the most important thing of all.        When it's time to get out of bed, one of us or a family member will sit next to you on the bed to give you support.     If your doctor or nurse tells you to use a walker, crutches, a cane, or a brace, be sure you use it every time you get out of bed, even if you think you don't need it.    Learning Progress Summary           Patient Acceptance, E, VU,NR by  at 6/10/2020 0912    Comment:  Pt instructed in lumbar mobility exercises including: lower trunk rotations, single knee-to-chest, seated rollouts, and seated forward flexion.    Acceptance, E,D, NR by  at 6/9/2020  1427    Acceptance, E, NR by KG at 6/8/2020 1415   Significant Other Acceptance, E,D, NR by  at 6/9/2020 1427                   Point: Home exercise program (Done)     Description:   Instruct learner(s) on appropriate technique for monitoring, assisting and/or progressing patient with therapeutic exercises and activities.              Learning Progress Summary           Patient Acceptance, E, VU,NR by  at 6/10/2020 0912    Comment:  Pt instructed in lumbar mobility exercises including: lower trunk rotations, single knee-to-chest, seated rollouts, and seated forward flexion.                   Point: Body mechanics (In Progress)     Description:   Instruct learner(s) on proper positioning and spine alignment for patient and/or caregiver during mobility tasks and/or exercises.              Learning Progress Summary           Patient Acceptance, E, VU,NR by  at 6/10/2020 0912    Comment:  Pt instructed in lumbar mobility exercises including: lower trunk rotations, single knee-to-chest, seated rollouts, and seated forward flexion.    Acceptance, E,D, NR by  at 6/9/2020 1427    Acceptance, E, NR by KG at 6/8/2020 1415   Significant Other Acceptance, E,D, NR by  at 6/9/2020 1427                   Point: Precautions (In Progress)     Description:   Instruct learner(s) on prescribed precautions during mobility and gait tasks              Learning Progress Summary           Patient Acceptance, E, VU,NR by  at 6/10/2020 0912    Comment:  Pt instructed in lumbar mobility exercises including: lower trunk rotations, single knee-to-chest, seated rollouts, and seated forward flexion.    Acceptance, E,D, NR by  at 6/9/2020 1427    Acceptance, E, NR by KG at 6/8/2020 1415   Significant Other Acceptance, E,D, NR by  at 6/9/2020 1427                               User Key     Initials Effective Dates Name Provider Type Discipline     05/29/18 -  Rosie Herman, PT Physical Therapist PT    KG 05/22/20 -   Loli Dee, PT Physical Therapist PT     03/19/20 -  Andra Wright, PT Student PT Student PT              PT Recommendation and Plan     Plan of Care Reviewed With: patient  Progress: improving  Outcome Summary: Pt performed all bed mobility and transfer with SPV, and increased ambulation distance to 700' with CGA and no AD. Pt required one standing rest break due to low back pain; incorporated lumbar mobility exercises from EOB and supine without relief (NSG notified). Pt able to perform dynamic balance activities safely and with CGA. Recommend home with assist at d/c.      Time Calculation:   PT Charges     Row Name 06/10/20 0751             Time Calculation    Start Time  0751  -AC      PT Received On  06/10/20  -AC         Time Calculation- PT    Total Timed Code Minutes- PT  17 minute(s)  -AC         Timed Charges    36748 - PT Therapeutic Exercise Minutes  7  -AC      48268 - Gait Training Minutes   10  -AC        User Key  (r) = Recorded By, (t) = Taken By, (c) = Cosigned By    Initials Name Provider Type    AC Rosie Herman, PT Physical Therapist        Therapy Charges for Today     Code Description Service Date Service Provider Modifiers Qty    19380390520 HC GAIT TRAINING EA 15 MIN 6/10/2020 Rosie Herman, PT GP 1          PT G-Codes  Outcome Measure Options: AM-PAC 6 Clicks Basic Mobility (PT)  AM-PAC 6 Clicks Score (PT): 21  AM-PAC 6 Clicks Score (OT): 18  Modified Douglas Scale: 3 - Moderate disability.  Requiring some help, but able to walk without assistance.    Rosie Herman PT  6/10/2020

## 2020-06-11 ENCOUNTER — READMISSION MANAGEMENT (OUTPATIENT)
Dept: CALL CENTER | Facility: HOSPITAL | Age: 55
End: 2020-06-11

## 2020-06-11 NOTE — OUTREACH NOTE
Prep Survey      Responses   [facility] facility patient discharged from?  [place]   Is LACE score < 7 ?  No   Eligibility  Readm Mgmt   Discharge diagnosis  **Left-sided weakness   Does the patient have one of the following disease processes/diagnoses(primary or secondary)?  Stroke (TIA)   Does the patient have Home health ordered?  No   Is there a DME ordered?  No   Medication alerts for this patient  ASA    Prep survey completed?  Yes          [place] RN

## 2020-06-12 ENCOUNTER — READMISSION MANAGEMENT (OUTPATIENT)
Dept: CALL CENTER | Facility: HOSPITAL | Age: 55
End: 2020-06-12

## 2020-06-16 ENCOUNTER — READMISSION MANAGEMENT (OUTPATIENT)
Dept: CALL CENTER | Facility: HOSPITAL | Age: 55
End: 2020-06-16

## 2022-12-16 NOTE — OUTREACH NOTE
Stroke Week 1 Survey      Responses   [facility] patient discharged from?  [place]   Does the patient have one of the following disease processes/diagnoses(primary or secondary)?  Stroke (TIA)   Is there a successful TCM telephone encounter documented?  No   Week 1 attempt successful?  Yes   Call start time  1002   Call end time  1015   Discharge diagnosis  Left-sided weakness   Is patient permission given to speak with other caregiver?  Yes   List who call center can speak with  Breanna wife   Meds reviewed with patient/caregiver?  Yes   Is the patient having any side effects they believe may be caused by any medication additions or changes?  No   Does the patient have all medications ordered at discharge?  Yes   Is the patient taking all medications as directed (includes completed medication regime)?  No   What is preventing the patient from taking all medications as directed?  Desires to consult PCP first   Medication comments  Lipitor will discuss with PCP   Does the patient have a primary care provider?   Yes   Does the patient have an appointment with their PCP within 7 days of discharge?  Yes   Has the patient kept scheduled appointments due by today?  N/A   Comments  06/22/2020 PCP   Has home health visited the patient within 72 hours of discharge?  N/A   Psychosocial issues?  No   Does the patient require any assistance with activities of daily living such as eating, bathing, dressing, walking, etc.?  No   Does the patient have any residual symptoms from stroke/TIA?  No   Does the patient understand the diet ordered at discharge?  Yes   Did the patient receive a copy of their discharge instructions?  Yes   Nursing interventions  Reviewed instructions with patient, Educated on MyChart   What is the patient's perception of their health status since discharge?  Improving   Nursing interventions  Nurse provided patient education   Is the patient able to teach back FAST for Stroke?  Yes   Is the  [FreeTextEntry1] : s/w Dr. Alarcon, will pursue P2Y12 testing patient/caregiver able to teach back the risk factors for a stroke?  High blood pressure-goal below 120/80, High Cholesterol, Diabetes, Physical inactivity and obesity, Carotid or other artery disease, History of TIAs   Is the patient/caregiver able to teach back signs and symptoms related to disease process for when to call PCP?  Yes   Is the patient/caregiver able to teach back signs and symptoms related to disease process for when to call 911?  Yes   Is the patient/caregiver able to teach back the hierarchy of who to call/visit for symptoms/problems? PCP, Specialist, Home health nurse, Urgent Care, ED, 911  Yes   Week 1 call completed?  Yes   Revoked  No further contact(revokes)-requires comment   Graduated/Revoked comments  Aware of Nurse Call Line,          Nica Klein RN

## 2023-05-12 NOTE — THERAPY TREATMENT NOTE
Acute Care - Speech Language Pathology Treatment Note  [facility]     Patient Name: Pernell Adkins  : 1965  MRN: [id number]  Today's Date: 2020         Admit Date: 2020    Visit Dx:      ICD-10-CM ICD-9-CM   1. Left-sided weakness R53.1 728.87   2. Dysarthria R47.1 784.51   3. Acute ischemic stroke (CMS/HCC) I63.9 434.91   4. Hypokalemia E87.6 276.8   5. Chest pain, unspecified type R07.9 786.50   6. 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 Wife @ bedside.  -AC  --     Care Plan Review  evaluation/treatment results reviewed;care plan/treatment goals reviewed;risks/benefits reviewed;current/potential barriers reviewed;patient/other agree to care plan  -AC  --     Care Plan Review, Other Participant(s)  spouse  -AC  --     Therapy Frequency (Swallow)  PRN  -AC  --     Therapy Frequency (SLP SLC)  5 days per week  -AC  --     Patient Effort  good  -AC  good  -CL     Existing Precautions/Restrictions  --  fall  -CL     Recorded by [AC] Fox  Trisha CHEN MS CCC-SLP 06/09/20 1645 [CL] Quynh Díaz, OT 06/09/20 1423     Row Name 06/09/20 1116             Vital Signs    Pre Systolic BP Rehab  102  -CL      Pre Treatment Diastolic BP  66  -CL      Post Systolic BP Rehab  126  -CL      Post Treatment Diastolic BP  85  -CL      Pretreatment Heart Rate (beats/min)  62  -CL      Posttreatment Heart Rate (beats/min)  62  -CL      Pre SpO2 (%)  95  -CL      O2 Delivery Pre Treatment  room air  -CL      Post SpO2 (%)  97  -CL      O2 Delivery Post Treatment  room air  -CL      Pre Patient Position  Supine  -CL      Intra Patient Position  Standing  -CL      Post Patient Position  Sitting  -CL      Recorded by [CL] Quynh Díaz, OT 06/09/20 1423      Row Name 06/09/20 1116             Cognitive Assessment/Intervention- PT/OT    Affect/Mental Status (Cognitive)  WFL  -CL      Orientation Status (Cognition)  oriented x 3  -CL      Follows Commands (Cognition)  WFL  -CL      Cognitive Function (Cognitive)  WFL  -CL      Recorded by [CL] Quynh Díaz OT 06/09/20 1423      Row Name 06/09/20 1116             Bed Mobility Assessment/Treatment    Bed Mobility Assessment/Treatment  supine-sit  -CL      Supine-Sit [place] (Bed Mobility)  contact guard;verbal cues  -CL      Assistive Device (Bed Mobility)  bed rails;draw sheet;head of bed elevated  -CL      Recorded by [CL] Quynh Díaz OT 06/09/20 1423      Row Name 06/09/20 1116             Functional Mobility    Functional Mobility- Ind. Level  minimum assist (75% patient effort);1 person + 1 person to manage equipment;verbal cues required  -CL      Functional Mobility-Distance (Feet)  300  -CL      Recorded by [CL] Quynh Díaz, OT 06/09/20 1423      Row Name 06/09/20 1116             Transfer Assessment/Treatment    Transfer Assessment/Treatment  sit-stand transfer;stand-sit transfer  -CL      Recorded by [CL] Quynh Díaz OT 06/09/20 1423      Row Name 06/09/20 1116             Sit-Stand Transfer    Sit-Stand  [place] (Transfers)  minimum assist (75% patient effort);verbal cues  -CL      Recorded by [CL] Quynh Díaz, OT 06/09/20 1423      Row Name 06/09/20 1116             Stand-Sit Transfer    Stand-Sit [place] (Transfers)  minimum assist (75% patient effort);verbal cues  -CL      Recorded by [CL] Quynh Díaz, OT 06/09/20 1423      Row Name 06/09/20 1116             Motor Skills Assessment/Interventions    Additional Documentation  Therapeutic Exercise (Group)  -CL      Recorded by [CL] Quynh Díaz, OT 06/09/20 1423      Row Name 06/09/20 1116             Therapeutic Exercise    61848 - OT Therapeutic Activity Minutes  15  -CL      Recorded by [CL] Quynh Díaz, OT 06/09/20 1423      Row Name 06/09/20 1116             Balance    Balance  static sitting balance;static standing balance  -CL      Recorded by [CL] Quynh Díaz, OT 06/09/20 1423      Row Name 06/09/20 1116             Static Sitting Balance    Level of [place] (Unsupported Sitting, Static Balance)  supervision  -CL      Sitting Position (Unsupported Sitting, Static Balance)  sitting on edge of bed  -CL      Recorded by [CL] Quynh Díaz, OT 06/09/20 1423      Row Name 06/09/20 1116             Static Standing Balance    Level of [place] (Supported Standing, Static Balance)  contact guard assist  -CL      Recorded by [CL] Quynh Díaz, OT 06/09/20 1423      Row Name 06/09/20 1116             Positioning and Restraints    Pre-Treatment Position  in bed  -CL      Post Treatment Position  chair  -CL      In Chair  notified nsg;reclined;call light within reach;encouraged to call for assist;exit alarm on;waffle cushion;legs elevated;heels elevated  -CL      Recorded by [CL] Quynh Díaz, OT 06/09/20 1423      Row Name 06/09/20 1620             Pain Scale: Numbers Pre/Post-Treatment    Pain Scale: Numbers, Pretreatment  0/10 - no pain  -AC      Pain Scale: Numbers, Post-Treatment  0/10 - no pain  -AC      Recorded by [AC] Trisha Braxton, MS  [facility] 06/09/20 1645      Row Name 06/09/20 1116             Pain Scale 2: FACES Pre/Post-Treatment    Pain 2: FACES Scale, Pretreatment  2-->hurts little bit  -CL      Pain 2: FACES Scale, Post-Treatment  2-->hurts little bit  -CL      Pain Location 2 - Orientation  generalized  -CL      Pre/Post Treatment Pain 2 Comment  tolerated  -CL      Pain Intervention(s) 2  Repositioned;Ambulation/increased activity  -CL      Recorded by [CL] Quynh Díaz, OT 06/09/20 1423      Row Name 06/09/20 1116             Outcome Summary/Treatment Plan (OT)    Anticipated Discharge Disposition (OT)  home with assist;home with OP services  -CL      Recorded by [CL] Quynh Díaz, OT 06/09/20 1423      Row Name 06/09/20 1620             Outcome Summary/Treatment Plan (SLP)    Daily Summary of Progress (SLP)  progress toward functional goals as expected  -AC      Plan for Continued Treatment (SLP)  Pt ready for upgrade to regular diet. Dysarthria seemingly resolved - though pt does have baseline hyponasality r/t severe allergies per pt/spouse. Pt would benefit from cont'd cognitive-communication dx tx. Pt's wife reported confusion waxes/wanes.   -AC      Anticipated Dischage Disposition (SLP)  anticipate therapy at next level of care;home with home health  -AC      Recorded by [AC] Trisha Braxton MS CCC-SLP 06/09/20 3325        User Key  (r) = Recorded By, (t) = Taken By, (c) = Cosigned By    Initials Name Effective Dates Discipline    AC Trisha Braxton, MS CCC-SLP 07/27/17 -  SLP    CL Quynh Díaz, OT 04/03/18 -  OT          EDUCATION  The patient has been educated in the following areas:   Cognitive Impairment Communication Impairment Oral Care/Hydration.    SLP Recommendation and Plan  Daily Summary of Progress (SLP): progress toward functional goals as expected     Plan for Continued Treatment (SLP): Pt ready for upgrade to regular diet. Dysarthria seemingly resolved - though pt does have baseline hyponasality r/t severe allergies  per pt/spouse. Pt would benefit from cont'd cognitive-communication dx tx. Pt's wife reported confusion waxes/wanes.   Anticipated Dischage Disposition (SLP): anticipate therapy at next level of care, home with home health             SLP GOALS     Row Name 06/09/20 1620 06/08/20 0945 06/08/20 0915       Oral Nutrition/Hydration Goal 1 (SLP)    Oral Nutrition/Hydration Goal 1, SLP  LTG: Pt will return to regular diet, thin liquids w/o overt s/sxs aspiration or difficulty.  -AC  --  LTG: Pt will return to regular diet, thin liquids w/o overt s/sxs aspiration or difficulty.  -VO    Time Frame (Oral Nutrition/Hydration Goal 1, SLP)  by discharge  -AC  --  by discharge  -VO    Progress/Outcomes (Oral Nutrition/Hydration Goal 1, SLP)  goal met  -AC  --  goal ongoing  -VO       Oral Nutrition/Hydration Goal 2 (SLP)    Oral Nutrition/Hydration Goal 2, SLP  Pt will demonstrate adequate mastication of regular solid w/o overt s/sxs aspiration or difficulty w/ 100% acc.  -AC  --  Pt will demonstrate adequate mastication of regular solid w/o overt s/sxs aspiration or difficulty w/ 100% acc.  -VO    Time Frame (Oral Nutrition/Hydration Goal 2, SLP)  short term goal (STG)  -AC  --  short term goal (STG)  -VO    Barriers (Oral Nutrition/Hydration Goal 2, SLP)  Pt tolerated trials of solids w/ adequate mastication, no significant oral residue, and no overt clinical s/sxs aspiration.   -AC  --  --    Progress/Outcomes (Oral Nutrition/Hydration Goal 2, SLP)  goal met  -AC  --  goal ongoing  -VO       Oral Nutrition/Hydration Goal (SLP)    Oral Nutrition/Hydration Goal, SLP  Pt will tolerate soft/whole diet w/ thins w/o s/sxs aspiration.  -AC  --  Pt will tolerate soft/whole diet w/ thins w/o s/sxs aspiration.  -VO    Time Frame (Oral Nutrition/Hydration Goal, SLP)  short term goal (STG)  -AC  --  short term goal (STG)  -VO    Barriers (Oral Nutrition/Hydration Goal, SLP)  No overt clinical s/sxs aspiration w/ solids or thin via  straw--even when pushed w/ several trials.  -AC  --  --    Progress/Outcomes (Oral Nutrition/Hydration Goal, SLP)  goal met  -AC  --  goal ongoing  -VO       Labial Strengthening Goal 1 (SLP)    Activity (Labial Strengthening Goal 1, SLP)  increase labial tone  -AC  --  increase labial tone  -VO    Increase Labial Tone  swallow trials;labial resistance exercises  -AC  --  swallow trials;labial resistance exercises  -VO    [employer]/Accuracy (Labial Strengthening Goal 1, SLP)  with minimal cues (75-90% accuracy)  -AC  --  with minimal cues (75-90% accuracy)  -VO    Time Frame (Labial Strengthening Goal 1, SLP)  short term goal (STG)  -AC  --  short term goal (STG)  -VO    Barriers (Labial Strengthening Goal 1, SLP)  Completed labial retraction/protrusion resistance exercises. Pt able to demonstrate w/ min cues - provide handout for HEP next session.  -AC  --  --    Progress/Outcomes (Labial Strengthening Goal 1, SLP)  goal no longer appropriate  -AC  --  goal ongoing  -VO       Lingual Strengthening Goal 1 (SLP)    Activity (Lingual Strengthening Goal 1, SLP)  increase lingual tone/sensation/control/coordination/movement  -AC  --  increase lingual tone/sensation/control/coordination/movement  -VO    Increase Lingual Tone/Sensation/Control/Coordination/Movement  swallow trials;lingual resistance exercises;lingual movement exercises  -AC  --  swallow trials;lingual resistance exercises;lingual movement exercises  -VO    [employer]/Accuracy (Lingual Strengthening Goal 1, SLP)  with minimal cues (75-90% accuracy)  -AC  --  with minimal cues (75-90% accuracy)  -VO    Time Frame (Lingual Strengthening Goal 1, SLP)  short term goal (STG)  -AC  --  short term goal (STG)  -VO    Barriers (Lingual Strengthening Goal 1, SLP)  Provide handout for HEP next session.  -AC  --  --    Progress/Outcomes (Lingual Strengthening Goal 1, SLP)  goal no longer appropriate  -AC  --  goal ongoing  -VO       Articulation Goal 1 (SLP)     Improve Articulation Goal 1 (SLP)  by over-articulating in connected speech;by over-articulating at phrase level;90%;with minimal cues (75-90%)  -AC  by over-articulating in connected speech;by over-articulating at phrase level;90%;with minimal cues (75-90%)  -VO  --    Time Frame (Articulation Goal 1, SLP)  short term goal (STG)  -AC  short term goal (STG)  -VO  --    Progress (Articulation Goal 1, SLP)  90%;with minimal cues (75-90%)  -AC  --  --    Progress/Outcomes (Articulation Goal 1, SLP)  goal no longer appropriate  -AC  goal ongoing  -VO  --    Comment (Articulation Goal 1, SLP)  Pt/spouse indicate speech back to baseline. No evdience of imprecise articulation today, though intelligibility is somewhat affected by hyponasality. Baseline hyponasality r/t severe allergies per pt/spouse.  -AC  --  --       Pragmatic Skills Goal 1 (SLP)    Improve Pragmatic Skills Goal 1 (SLP)  maintain eye contact;90%;with minimal cues (75-90%)  -AC  maintain eye contact;90%;with minimal cues (75-90%)  -VO  --    Time Frame (Pragmatic Skills Goal 1, SLP)  short term goal (STG)  -AC  short term goal (STG)  -VO  --    Progress (Pragmatic Skills Goal 1, SLP)  100%;independently (over 90% accuracy)  -AC  --  --    Progress/Outcomes (Pragmatic Skills Goal 1, SLP)  goal met  -AC  goal ongoing  -VO  --       Right Hemisphere Function Goal 1 (SLP)    Improve Right Hemisphere Function Through Goal 1 (SLP)  demonstrate awareness of communication partner in left visual field;identify physical/cognitive strengths and limitations;use compensatory strategies for left neglect;complete visuo-perceptual activities (L/R discrimination, spatial concepts);90%;with minimal cues (75-90%)  -AC  demonstrate awareness of communication partner in left visual field;identify physical/cognitive strengths and limitations;use compensatory strategies for left neglect;complete visuo-perceptual activities (L/R discrimination, spatial concepts);90%;with minimal  cues (75-90%)  -VO  --    Time Frame (Right Hemisphere Function Goal 1, SLP)  short term goal (STG)  -AC  short term goal (STG)  -VO  --    Progress/Outcomes (Right Hemisphere Function Goal 1, SLP)  goal ongoing  -AC  goal ongoing  -VO  --       Additional Goal 1 (SLP)    Additional Goal 1, SLP  Pt will improve cognitive-communication skills in order to allow optimal participation in care and ADLs  -AC  Pt will improve cognitive-communication skills in order to allow optimal participation in care and ADLs  -VO  --    Time Frame (Additional Goal 1, SLP)  by discharge  -AC  by discharge  -VO  --    Progress/Outcomes (Additional Goal 1, SLP)  good progress toward goal  -AC  goal ongoing  -VO  --      User Key  (r) = Recorded By, (t) = Taken By, (c) = Cosigned By    Initials Name Provider Type    Trisha Godinez MS CCC-SLP Speech and Language Pathologist    Jaye Troncoso MA,CCC-SLP Speech and Language Pathologist              Time Calculation:     Time Calculation- SLP     Row Name 06/09/20 1650             Time Calculation- SLP    SLP Start Time  1620  -AC      SLP Received On  06/09/20  -AC        User Key  (r) = Recorded By, (t) = Taken By, (c) = Cosigned By    Initials Name Provider Type    Trisha Godinez MS CCC-SLP Speech and Language Pathologist          Therapy Charges for Today     Code Description Service Date Service Provider Modifiers Qty    51179668269 HC ST TREATMENT SWALLOW 1 6/9/2020 Trisha Braxton MS CCC-SLP GN 1    70982219576 HC ST TREATMENT SPEECH 1 6/9/2020 Trisha Braxton MS CCC-SLP GN 1                     Trisha Braxton MS CCC-JESUS  6/9/2020     Has The Growth Been Previously Biopsied?: has been previously biopsied

## 2025-07-01 NOTE — OUTREACH NOTE
Patient arrives with c/o STD check and pain with urination. Yellow greenish drainage.     Past Medical History:   Diagnosis Date    Anxiety     Depression     Drug abuse  (CMD)     heroin [facility] at [facility]  2019 used cocaine smoke 4 years    HTN (hypertension)        Past Surgical History:   Procedure Laterality Date    Appendectomy      age 14 years        Stroke Week 1 Survey      Responses   [facility] patient discharged from?  Preemption   Does the patient have one of the following disease processes/diagnoses(primary or secondary)?  Stroke (TIA)   Is there a successful TCM telephone encounter documented?  No   Week 1 attempt successful?  No   Unsuccessful attempts  Attempt 1          Mayank Khan RN